# Patient Record
Sex: FEMALE | Race: WHITE | NOT HISPANIC OR LATINO | Employment: FULL TIME | URBAN - NONMETROPOLITAN AREA
[De-identification: names, ages, dates, MRNs, and addresses within clinical notes are randomized per-mention and may not be internally consistent; named-entity substitution may affect disease eponyms.]

---

## 2017-04-23 ENCOUNTER — HOSPITAL ENCOUNTER (OUTPATIENT)
Facility: HOSPITAL | Age: 37
Discharge: HOME | End: 2017-04-23
Attending: OBSTETRICS & GYNECOLOGY | Admitting: OBSTETRICS & GYNECOLOGY

## 2017-04-23 VITALS
TEMPERATURE: 98.3 F | HEART RATE: 102 BPM | RESPIRATION RATE: 18 BRPM | DIASTOLIC BLOOD PRESSURE: 59 MMHG | SYSTOLIC BLOOD PRESSURE: 103 MMHG

## 2017-04-23 LAB
BACTERIA UR QL AUTO: ABNORMAL /HPF
BASOPHILS # BLD AUTO: 0.04 10*3/MM3 (ref 0–0.3)
BASOPHILS NFR BLD AUTO: 0.2 % (ref 0–2)
BILIRUB UR QL STRIP: NEGATIVE
CLARITY UR: ABNORMAL
COLOR UR: ABNORMAL
DEPRECATED RDW RBC AUTO: 47.4 FL (ref 37–54)
EOSINOPHIL # BLD AUTO: 0.41 10*3/MM3 (ref 0–0.7)
EOSINOPHIL NFR BLD AUTO: 2.2 % (ref 0–5)
ERYTHROCYTE [DISTWIDTH] IN BLOOD BY AUTOMATED COUNT: 14.7 % (ref 11.5–14.5)
GLUCOSE UR STRIP-MCNC: NEGATIVE MG/DL
HCT VFR BLD AUTO: 34.9 % (ref 37–47)
HGB BLD-MCNC: 11.5 G/DL (ref 12–16)
HGB UR QL STRIP.AUTO: ABNORMAL
IMM GRANULOCYTES # BLD: 0.14 10*3/MM3 (ref 0–0.03)
IMM GRANULOCYTES NFR BLD: 0.8 % (ref 0–0.5)
KETONES UR QL STRIP: ABNORMAL
LEUKOCYTE ESTERASE UR QL STRIP.AUTO: ABNORMAL
LYMPHOCYTES # BLD AUTO: 1.78 10*3/MM3 (ref 1–3)
LYMPHOCYTES NFR BLD AUTO: 9.6 % (ref 21–51)
MCH RBC QN AUTO: 30.2 PG (ref 27–33)
MCHC RBC AUTO-ENTMCNC: 33 G/DL (ref 33–37)
MCV RBC AUTO: 91.6 FL (ref 80–94)
MONOCYTES # BLD AUTO: 1.21 10*3/MM3 (ref 0.1–0.9)
MONOCYTES NFR BLD AUTO: 6.5 % (ref 0–10)
NEUTROPHILS # BLD AUTO: 14.97 10*3/MM3 (ref 1.4–6.5)
NEUTROPHILS NFR BLD AUTO: 80.7 % (ref 30–70)
NITRITE UR QL STRIP: POSITIVE
PH UR STRIP.AUTO: 6.5 [PH] (ref 5–8)
PLATELET # BLD AUTO: 319 10*3/MM3 (ref 130–400)
PMV BLD AUTO: 9.5 FL (ref 6–10)
PROT UR QL STRIP: ABNORMAL
RBC # BLD AUTO: 3.81 10*6/MM3 (ref 4.2–5.4)
RBC # UR: ABNORMAL /HPF
REF LAB TEST METHOD: ABNORMAL
SP GR UR STRIP: 1.02 (ref 1–1.03)
SQUAMOUS #/AREA URNS HPF: ABNORMAL /HPF
UROBILINOGEN UR QL STRIP: ABNORMAL
WBC NRBC COR # BLD: 18.55 10*3/MM3 (ref 4.5–12.5)
WBC UR QL AUTO: ABNORMAL /HPF

## 2017-04-23 PROCEDURE — P9612 CATHETERIZE FOR URINE SPEC: HCPCS

## 2017-04-23 PROCEDURE — 81001 URINALYSIS AUTO W/SCOPE: CPT | Performed by: OBSTETRICS & GYNECOLOGY

## 2017-04-23 PROCEDURE — 96372 THER/PROPH/DIAG INJ SC/IM: CPT

## 2017-04-23 PROCEDURE — G0463 HOSPITAL OUTPT CLINIC VISIT: HCPCS

## 2017-04-23 PROCEDURE — 25010000002 CEFTRIAXONE PER 250 MG: Performed by: OBSTETRICS & GYNECOLOGY

## 2017-04-23 PROCEDURE — 87077 CULTURE AEROBIC IDENTIFY: CPT | Performed by: OBSTETRICS & GYNECOLOGY

## 2017-04-23 PROCEDURE — 87086 URINE CULTURE/COLONY COUNT: CPT | Performed by: OBSTETRICS & GYNECOLOGY

## 2017-04-23 PROCEDURE — 87186 SC STD MICRODIL/AGAR DIL: CPT | Performed by: OBSTETRICS & GYNECOLOGY

## 2017-04-23 PROCEDURE — 85025 COMPLETE CBC W/AUTO DIFF WBC: CPT | Performed by: OBSTETRICS & GYNECOLOGY

## 2017-04-23 RX ORDER — CEFTRIAXONE 1 G/1
1 INJECTION, POWDER, FOR SOLUTION INTRAMUSCULAR; INTRAVENOUS EVERY 24 HOURS
Status: DISCONTINUED | OUTPATIENT
Start: 2017-04-23 | End: 2017-04-23 | Stop reason: HOSPADM

## 2017-04-23 RX ADMIN — CEFTRIAXONE SODIUM 1 G: 1 INJECTION, POWDER, FOR SOLUTION INTRAMUSCULAR; INTRAVENOUS at 10:51

## 2017-04-25 LAB — BACTERIA SPEC AEROBE CULT: ABNORMAL

## 2017-04-28 ENCOUNTER — TELEPHONE (OUTPATIENT)
Dept: FAMILY MEDICINE CLINIC | Facility: CLINIC | Age: 37
End: 2017-04-28

## 2017-04-28 NOTE — TELEPHONE ENCOUNTER
----- Message from Arely Bellamy MD sent at 4/28/2017  6:59 AM EDT -----  Please call patient. She has never been seen in this office. She went to the ER last week for something - they ran an UCx, but they never gave her an antibiotic. She does have an UTI and needs to be seen. Please call and let her know - and have her make an appointment as a new patient. 20 minute appt is okay (write Per Josesito). Thanks.      Left a message for her to return call.    Patient returned call & reports she is on keflex right now that they called & placed her on,is being seen in [place] & declined an apt. Here.

## 2018-11-30 ENCOUNTER — APPOINTMENT (OUTPATIENT)
Dept: GENERAL RADIOLOGY | Facility: HOSPITAL | Age: 38
End: 2018-11-30

## 2018-11-30 ENCOUNTER — HOSPITAL ENCOUNTER (EMERGENCY)
Facility: HOSPITAL | Age: 38
Discharge: HOME | End: 2018-11-30
Attending: EMERGENCY MEDICINE | Admitting: EMERGENCY MEDICINE

## 2018-11-30 VITALS
BODY MASS INDEX: 20.83 KG/M2 | RESPIRATION RATE: 18 BRPM | WEIGHT: 125 LBS | SYSTOLIC BLOOD PRESSURE: 123 MMHG | TEMPERATURE: 100.8 F | HEIGHT: 65 IN | DIASTOLIC BLOOD PRESSURE: 76 MMHG | HEART RATE: 88 BPM | OXYGEN SATURATION: 98 %

## 2018-11-30 DIAGNOSIS — K08.89 PAIN, DENTAL: ICD-10-CM

## 2018-11-30 DIAGNOSIS — R11.2 NON-INTRACTABLE VOMITING WITH NAUSEA, UNSPECIFIED VOMITING TYPE: Primary | ICD-10-CM

## 2018-11-30 LAB
ALBUMIN SERPL-MCNC: 4.1 G/DL (ref 3.5–5)
ALBUMIN/GLOB SERPL: 1.5 G/DL (ref 1.5–2.5)
ALP SERPL-CCNC: 69 U/L (ref 35–104)
ALT SERPL W P-5'-P-CCNC: 14 U/L (ref 10–36)
AMYLASE SERPL-CCNC: 71 U/L (ref 28–100)
ANION GAP SERPL CALCULATED.3IONS-SCNC: 1.6 MMOL/L (ref 3.6–11.2)
AST SERPL-CCNC: 15 U/L (ref 10–30)
BACTERIA UR QL AUTO: ABNORMAL /HPF
BASOPHILS # BLD AUTO: 0.02 10*3/MM3 (ref 0–0.3)
BASOPHILS NFR BLD AUTO: 0.2 % (ref 0–2)
BILIRUB SERPL-MCNC: 0.6 MG/DL (ref 0.2–1.8)
BILIRUB UR QL STRIP: NEGATIVE
BUN BLD-MCNC: 12 MG/DL (ref 7–21)
BUN/CREAT SERPL: 15.8 (ref 7–25)
CALCIUM SPEC-SCNC: 9 MG/DL (ref 7.7–10)
CHLORIDE SERPL-SCNC: 108 MMOL/L (ref 99–112)
CLARITY UR: CLEAR
CO2 SERPL-SCNC: 28.4 MMOL/L (ref 24.3–31.9)
COLOR UR: YELLOW
CREAT BLD-MCNC: 0.76 MG/DL (ref 0.43–1.29)
CRP SERPL-MCNC: 1.54 MG/DL (ref 0–0.99)
DEPRECATED RDW RBC AUTO: 48.4 FL (ref 37–54)
EOSINOPHIL # BLD AUTO: 0.17 10*3/MM3 (ref 0–0.7)
EOSINOPHIL NFR BLD AUTO: 1.8 % (ref 0–5)
ERYTHROCYTE [DISTWIDTH] IN BLOOD BY AUTOMATED COUNT: 15.1 % (ref 11.5–14.5)
FLUAV AG NPH QL: NEGATIVE
FLUBV AG NPH QL IA: NEGATIVE
GFR SERPL CREATININE-BSD FRML MDRD: 85 ML/MIN/1.73
GLOBULIN UR ELPH-MCNC: 2.7 GM/DL
GLUCOSE BLD-MCNC: 98 MG/DL (ref 70–110)
GLUCOSE UR STRIP-MCNC: NEGATIVE MG/DL
HCT VFR BLD AUTO: 40.8 % (ref 37–47)
HGB BLD-MCNC: 13.1 G/DL (ref 12–16)
HGB UR QL STRIP.AUTO: NEGATIVE
HYALINE CASTS UR QL AUTO: ABNORMAL /LPF
IMM GRANULOCYTES # BLD: 0.01 10*3/MM3 (ref 0–0.03)
IMM GRANULOCYTES NFR BLD: 0.1 % (ref 0–0.5)
KETONES UR QL STRIP: NEGATIVE
LEUKOCYTE ESTERASE UR QL STRIP.AUTO: ABNORMAL
LIPASE SERPL-CCNC: 36 U/L (ref 13–60)
LYMPHOCYTES # BLD AUTO: 0.66 10*3/MM3 (ref 1–3)
LYMPHOCYTES NFR BLD AUTO: 7 % (ref 21–51)
MAGNESIUM SERPL-MCNC: 2 MG/DL (ref 1.7–2.6)
MCH RBC QN AUTO: 28.2 PG (ref 27–33)
MCHC RBC AUTO-ENTMCNC: 32.1 G/DL (ref 33–37)
MCV RBC AUTO: 87.9 FL (ref 80–94)
MONOCYTES # BLD AUTO: 0.31 10*3/MM3 (ref 0.1–0.9)
MONOCYTES NFR BLD AUTO: 3.3 % (ref 0–10)
NEUTROPHILS # BLD AUTO: 8.32 10*3/MM3 (ref 1.4–6.5)
NEUTROPHILS NFR BLD AUTO: 87.6 % (ref 30–70)
NITRITE UR QL STRIP: NEGATIVE
OSMOLALITY SERPL CALC.SUM OF ELEC: 275.4 MOSM/KG (ref 273–305)
PH UR STRIP.AUTO: >=9 [PH] (ref 5–8)
PLATELET # BLD AUTO: 315 10*3/MM3 (ref 130–400)
PMV BLD AUTO: 10.1 FL (ref 6–10)
POTASSIUM BLD-SCNC: 3.9 MMOL/L (ref 3.5–5.3)
PROT SERPL-MCNC: 6.8 G/DL (ref 6–8)
PROT UR QL STRIP: NEGATIVE
RBC # BLD AUTO: 4.64 10*6/MM3 (ref 4.2–5.4)
RBC # UR: ABNORMAL /HPF
REF LAB TEST METHOD: ABNORMAL
SODIUM BLD-SCNC: 138 MMOL/L (ref 135–153)
SP GR UR STRIP: 1.02 (ref 1–1.03)
SQUAMOUS #/AREA URNS HPF: ABNORMAL /HPF
UROBILINOGEN UR QL STRIP: ABNORMAL
WBC NRBC COR # BLD: 9.49 10*3/MM3 (ref 4.5–12.5)
WBC UR QL AUTO: ABNORMAL /HPF

## 2018-11-30 PROCEDURE — 85025 COMPLETE CBC W/AUTO DIFF WBC: CPT | Performed by: PHYSICIAN ASSISTANT

## 2018-11-30 PROCEDURE — 87804 INFLUENZA ASSAY W/OPTIC: CPT | Performed by: PHYSICIAN ASSISTANT

## 2018-11-30 PROCEDURE — 71045 X-RAY EXAM CHEST 1 VIEW: CPT | Performed by: RADIOLOGY

## 2018-11-30 PROCEDURE — 71045 X-RAY EXAM CHEST 1 VIEW: CPT

## 2018-11-30 PROCEDURE — 83735 ASSAY OF MAGNESIUM: CPT | Performed by: PHYSICIAN ASSISTANT

## 2018-11-30 PROCEDURE — 25010000002 CEFTRIAXONE: Performed by: PHYSICIAN ASSISTANT

## 2018-11-30 PROCEDURE — 25010000002 ONDANSETRON PER 1 MG: Performed by: PHYSICIAN ASSISTANT

## 2018-11-30 PROCEDURE — 80053 COMPREHEN METABOLIC PANEL: CPT | Performed by: PHYSICIAN ASSISTANT

## 2018-11-30 PROCEDURE — 99284 EMERGENCY DEPT VISIT MOD MDM: CPT

## 2018-11-30 PROCEDURE — 82150 ASSAY OF AMYLASE: CPT | Performed by: PHYSICIAN ASSISTANT

## 2018-11-30 PROCEDURE — 96365 THER/PROPH/DIAG IV INF INIT: CPT

## 2018-11-30 PROCEDURE — 96375 TX/PRO/DX INJ NEW DRUG ADDON: CPT

## 2018-11-30 PROCEDURE — 81001 URINALYSIS AUTO W/SCOPE: CPT | Performed by: PHYSICIAN ASSISTANT

## 2018-11-30 PROCEDURE — 87040 BLOOD CULTURE FOR BACTERIA: CPT | Performed by: PHYSICIAN ASSISTANT

## 2018-11-30 PROCEDURE — 83690 ASSAY OF LIPASE: CPT | Performed by: PHYSICIAN ASSISTANT

## 2018-11-30 PROCEDURE — 86140 C-REACTIVE PROTEIN: CPT | Performed by: PHYSICIAN ASSISTANT

## 2018-11-30 RX ORDER — SODIUM CHLORIDE 0.9 % (FLUSH) 0.9 %
10 SYRINGE (ML) INJECTION AS NEEDED
Status: DISCONTINUED | OUTPATIENT
Start: 2018-11-30 | End: 2018-11-30 | Stop reason: HOSPADM

## 2018-11-30 RX ORDER — ONDANSETRON 2 MG/ML
4 INJECTION INTRAMUSCULAR; INTRAVENOUS ONCE
Status: COMPLETED | OUTPATIENT
Start: 2018-11-30 | End: 2018-11-30

## 2018-11-30 RX ORDER — FAMOTIDINE 20 MG/1
20 TABLET, FILM COATED ORAL 2 TIMES DAILY
Qty: 28 TABLET | Refills: 0 | Status: SHIPPED | OUTPATIENT
Start: 2018-11-30 | End: 2019-03-19

## 2018-11-30 RX ORDER — FAMOTIDINE 10 MG/ML
20 INJECTION, SOLUTION INTRAVENOUS ONCE
Status: COMPLETED | OUTPATIENT
Start: 2018-11-30 | End: 2018-11-30

## 2018-11-30 RX ORDER — ONDANSETRON 4 MG/1
4 TABLET, ORALLY DISINTEGRATING ORAL EVERY 6 HOURS PRN
Qty: 10 TABLET | Refills: 0 | Status: SHIPPED | OUTPATIENT
Start: 2018-11-30 | End: 2019-03-19 | Stop reason: SDUPTHER

## 2018-11-30 RX ORDER — ACETAMINOPHEN 500 MG
1000 TABLET ORAL ONCE
Status: COMPLETED | OUTPATIENT
Start: 2018-11-30 | End: 2018-11-30

## 2018-11-30 RX ORDER — AMOXICILLIN AND CLAVULANATE POTASSIUM 875; 125 MG/1; MG/1
1 TABLET, FILM COATED ORAL 2 TIMES DAILY
Qty: 20 TABLET | Refills: 0 | Status: SHIPPED | OUTPATIENT
Start: 2018-11-30 | End: 2018-12-10

## 2018-11-30 RX ADMIN — ONDANSETRON 4 MG: 2 INJECTION, SOLUTION INTRAMUSCULAR; INTRAVENOUS at 11:39

## 2018-11-30 RX ADMIN — CEFTRIAXONE 1 G: 1 INJECTION, POWDER, FOR SOLUTION INTRAMUSCULAR; INTRAVENOUS at 11:38

## 2018-11-30 RX ADMIN — SODIUM CHLORIDE, PRESERVATIVE FREE 10 ML: 5 INJECTION INTRAVENOUS at 11:39

## 2018-11-30 RX ADMIN — BENZOCAINE: 200 LIQUID DENTAL; ORAL; PERIODONTAL at 13:47

## 2018-11-30 RX ADMIN — SODIUM CHLORIDE 1000 ML: 9 INJECTION, SOLUTION INTRAVENOUS at 11:38

## 2018-11-30 RX ADMIN — FAMOTIDINE 20 MG: 10 INJECTION, SOLUTION INTRAVENOUS at 11:39

## 2018-11-30 RX ADMIN — ACETAMINOPHEN 1000 MG: 500 TABLET ORAL at 13:46

## 2018-12-05 LAB
BACTERIA SPEC AEROBE CULT: NORMAL
BACTERIA SPEC AEROBE CULT: NORMAL

## 2019-03-19 ENCOUNTER — APPOINTMENT (OUTPATIENT)
Dept: GENERAL RADIOLOGY | Facility: HOSPITAL | Age: 39
End: 2019-03-19

## 2019-03-19 ENCOUNTER — APPOINTMENT (OUTPATIENT)
Dept: CT IMAGING | Facility: HOSPITAL | Age: 39
End: 2019-03-19

## 2019-03-19 ENCOUNTER — HOSPITAL ENCOUNTER (EMERGENCY)
Facility: HOSPITAL | Age: 39
Discharge: HOME | End: 2019-03-19
Attending: EMERGENCY MEDICINE | Admitting: EMERGENCY MEDICINE

## 2019-03-19 VITALS
TEMPERATURE: 98.1 F | SYSTOLIC BLOOD PRESSURE: 109 MMHG | OXYGEN SATURATION: 99 % | HEART RATE: 83 BPM | WEIGHT: 120 LBS | BODY MASS INDEX: 19.29 KG/M2 | HEIGHT: 66 IN | DIASTOLIC BLOOD PRESSURE: 77 MMHG | RESPIRATION RATE: 18 BRPM

## 2019-03-19 DIAGNOSIS — R10.31 RIGHT LOWER QUADRANT ABDOMINAL PAIN: Primary | ICD-10-CM

## 2019-03-19 LAB
ALBUMIN SERPL-MCNC: 3.91 G/DL (ref 3.5–5.2)
ALBUMIN/GLOB SERPL: 1.5 G/DL
ALP SERPL-CCNC: 70 U/L (ref 39–117)
ALT SERPL W P-5'-P-CCNC: 8 U/L (ref 1–33)
AMYLASE SERPL-CCNC: 70 U/L (ref 28–100)
ANION GAP SERPL CALCULATED.3IONS-SCNC: 8.8 MMOL/L
AST SERPL-CCNC: 14 U/L (ref 1–32)
BASOPHILS # BLD AUTO: 0.01 10*3/MM3 (ref 0–0.2)
BASOPHILS # BLD AUTO: 0.02 10*3/MM3 (ref 0–0.2)
BASOPHILS NFR BLD AUTO: 0.1 % (ref 0–1.5)
BASOPHILS NFR BLD AUTO: 0.2 % (ref 0–1.5)
BILIRUB SERPL-MCNC: 0.4 MG/DL (ref 0.2–1.2)
BILIRUB UR QL STRIP: NEGATIVE
BUN BLD-MCNC: 12 MG/DL (ref 6–20)
BUN/CREAT SERPL: 17.6 (ref 7–25)
CALCIUM SPEC-SCNC: 8.6 MG/DL (ref 8.6–10.5)
CHLORIDE SERPL-SCNC: 104 MMOL/L (ref 98–107)
CLARITY UR: CLEAR
CO2 SERPL-SCNC: 22.2 MMOL/L (ref 22–29)
COLOR UR: YELLOW
CREAT BLD-MCNC: 0.68 MG/DL (ref 0.57–1)
CRP SERPL-MCNC: 1.5 MG/DL (ref 0–0.5)
CRP SERPL-MCNC: 1.58 MG/DL (ref 0–0.5)
D-LACTATE SERPL-SCNC: 0.7 MMOL/L (ref 0.5–2)
DEPRECATED RDW RBC AUTO: 47.9 FL (ref 37–54)
DEPRECATED RDW RBC AUTO: 48.3 FL (ref 37–54)
EOSINOPHIL # BLD AUTO: 0.25 10*3/MM3 (ref 0–0.4)
EOSINOPHIL # BLD AUTO: 0.34 10*3/MM3 (ref 0–0.4)
EOSINOPHIL NFR BLD AUTO: 3.1 % (ref 0.3–6.2)
EOSINOPHIL NFR BLD AUTO: 3.6 % (ref 0.3–6.2)
ERYTHROCYTE [DISTWIDTH] IN BLOOD BY AUTOMATED COUNT: 14.7 % (ref 12.3–15.4)
ERYTHROCYTE [DISTWIDTH] IN BLOOD BY AUTOMATED COUNT: 14.9 % (ref 12.3–15.4)
GFR SERPL CREATININE-BSD FRML MDRD: 96 ML/MIN/1.73
GLOBULIN UR ELPH-MCNC: 2.7 GM/DL
GLUCOSE BLD-MCNC: 99 MG/DL (ref 65–99)
GLUCOSE UR STRIP-MCNC: NEGATIVE MG/DL
HCG SERPL QL: NEGATIVE
HCT VFR BLD AUTO: 35.4 % (ref 34–46.6)
HCT VFR BLD AUTO: 40.7 % (ref 34–46.6)
HGB BLD-MCNC: 11.1 G/DL (ref 12–15.9)
HGB BLD-MCNC: 12.8 G/DL (ref 12–15.9)
HGB UR QL STRIP.AUTO: NEGATIVE
HOLD SPECIMEN: NORMAL
HOLD SPECIMEN: NORMAL
IMM GRANULOCYTES # BLD AUTO: 0.02 10*3/MM3 (ref 0–0.05)
IMM GRANULOCYTES # BLD AUTO: 0.02 10*3/MM3 (ref 0–0.05)
IMM GRANULOCYTES NFR BLD AUTO: 0.2 % (ref 0–0.5)
IMM GRANULOCYTES NFR BLD AUTO: 0.2 % (ref 0–0.5)
KETONES UR QL STRIP: NEGATIVE
LEUKOCYTE ESTERASE UR QL STRIP.AUTO: NEGATIVE
LIPASE SERPL-CCNC: 39 U/L (ref 13–60)
LYMPHOCYTES # BLD AUTO: 0.65 10*3/MM3 (ref 0.7–3.1)
LYMPHOCYTES # BLD AUTO: 0.82 10*3/MM3 (ref 0.7–3.1)
LYMPHOCYTES NFR BLD AUTO: 10.2 % (ref 19.6–45.3)
LYMPHOCYTES NFR BLD AUTO: 6.9 % (ref 19.6–45.3)
MCH RBC QN AUTO: 27.9 PG (ref 26.6–33)
MCH RBC QN AUTO: 28.2 PG (ref 26.6–33)
MCHC RBC AUTO-ENTMCNC: 31.4 G/DL (ref 31.5–35.7)
MCHC RBC AUTO-ENTMCNC: 31.4 G/DL (ref 31.5–35.7)
MCV RBC AUTO: 88.9 FL (ref 79–97)
MCV RBC AUTO: 89.8 FL (ref 79–97)
MONOCYTES # BLD AUTO: 0.22 10*3/MM3 (ref 0.1–0.9)
MONOCYTES # BLD AUTO: 0.29 10*3/MM3 (ref 0.1–0.9)
MONOCYTES NFR BLD AUTO: 2.7 % (ref 5–12)
MONOCYTES NFR BLD AUTO: 3.1 % (ref 5–12)
NEUTROPHILS # BLD AUTO: 6.75 10*3/MM3 (ref 1.4–7)
NEUTROPHILS # BLD AUTO: 8.07 10*3/MM3 (ref 1.4–7)
NEUTROPHILS NFR BLD AUTO: 83.7 % (ref 42.7–76)
NEUTROPHILS NFR BLD AUTO: 86 % (ref 42.7–76)
NITRITE UR QL STRIP: NEGATIVE
PH UR STRIP.AUTO: 5.5 [PH] (ref 5–8)
PLATELET # BLD AUTO: 237 10*3/MM3 (ref 140–450)
PLATELET # BLD AUTO: 272 10*3/MM3 (ref 140–450)
PMV BLD AUTO: 9.5 FL (ref 6–12)
PMV BLD AUTO: 9.6 FL (ref 6–12)
POTASSIUM BLD-SCNC: 4 MMOL/L (ref 3.5–5.2)
PROT SERPL-MCNC: 6.6 G/DL (ref 6–8.5)
PROT UR QL STRIP: NEGATIVE
RBC # BLD AUTO: 3.94 10*6/MM3 (ref 3.77–5.28)
RBC # BLD AUTO: 4.58 10*6/MM3 (ref 3.77–5.28)
SODIUM BLD-SCNC: 135 MMOL/L (ref 136–145)
SP GR UR STRIP: 1.02 (ref 1–1.03)
TROPONIN T SERPL-MCNC: <0.01 NG/ML (ref 0–0.03)
UROBILINOGEN UR QL STRIP: NORMAL
WBC NRBC COR # BLD: 8.07 10*3/MM3 (ref 3.4–10.8)
WBC NRBC COR # BLD: 9.39 10*3/MM3 (ref 3.4–10.8)
WHOLE BLOOD HOLD SPECIMEN: NORMAL
WHOLE BLOOD HOLD SPECIMEN: NORMAL

## 2019-03-19 PROCEDURE — 25010000002 MORPHINE PER 10 MG: Performed by: EMERGENCY MEDICINE

## 2019-03-19 PROCEDURE — 99285 EMERGENCY DEPT VISIT HI MDM: CPT

## 2019-03-19 PROCEDURE — 25010000002 IOPAMIDOL 61 % SOLUTION: Performed by: EMERGENCY MEDICINE

## 2019-03-19 PROCEDURE — 74177 CT ABD & PELVIS W/CONTRAST: CPT

## 2019-03-19 PROCEDURE — 82150 ASSAY OF AMYLASE: CPT | Performed by: EMERGENCY MEDICINE

## 2019-03-19 PROCEDURE — 84484 ASSAY OF TROPONIN QUANT: CPT | Performed by: EMERGENCY MEDICINE

## 2019-03-19 PROCEDURE — 25010000002 ONDANSETRON PER 1 MG: Performed by: EMERGENCY MEDICINE

## 2019-03-19 PROCEDURE — 83690 ASSAY OF LIPASE: CPT | Performed by: EMERGENCY MEDICINE

## 2019-03-19 PROCEDURE — 83605 ASSAY OF LACTIC ACID: CPT | Performed by: EMERGENCY MEDICINE

## 2019-03-19 PROCEDURE — 74177 CT ABD & PELVIS W/CONTRAST: CPT | Performed by: RADIOLOGY

## 2019-03-19 PROCEDURE — 25010000002 KETOROLAC TROMETHAMINE PER 15 MG: Performed by: EMERGENCY MEDICINE

## 2019-03-19 PROCEDURE — 93005 ELECTROCARDIOGRAM TRACING: CPT | Performed by: EMERGENCY MEDICINE

## 2019-03-19 PROCEDURE — 80053 COMPREHEN METABOLIC PANEL: CPT | Performed by: EMERGENCY MEDICINE

## 2019-03-19 PROCEDURE — 84703 CHORIONIC GONADOTROPIN ASSAY: CPT | Performed by: EMERGENCY MEDICINE

## 2019-03-19 PROCEDURE — 71045 X-RAY EXAM CHEST 1 VIEW: CPT

## 2019-03-19 PROCEDURE — 96374 THER/PROPH/DIAG INJ IV PUSH: CPT

## 2019-03-19 PROCEDURE — 81003 URINALYSIS AUTO W/O SCOPE: CPT | Performed by: EMERGENCY MEDICINE

## 2019-03-19 PROCEDURE — 86140 C-REACTIVE PROTEIN: CPT | Performed by: EMERGENCY MEDICINE

## 2019-03-19 PROCEDURE — 93010 ELECTROCARDIOGRAM REPORT: CPT | Performed by: INTERNAL MEDICINE

## 2019-03-19 PROCEDURE — 85025 COMPLETE CBC W/AUTO DIFF WBC: CPT | Performed by: EMERGENCY MEDICINE

## 2019-03-19 PROCEDURE — 71045 X-RAY EXAM CHEST 1 VIEW: CPT | Performed by: RADIOLOGY

## 2019-03-19 PROCEDURE — 96361 HYDRATE IV INFUSION ADD-ON: CPT

## 2019-03-19 PROCEDURE — 96375 TX/PRO/DX INJ NEW DRUG ADDON: CPT

## 2019-03-19 RX ORDER — KETOROLAC TROMETHAMINE 30 MG/ML
15 INJECTION, SOLUTION INTRAMUSCULAR; INTRAVENOUS ONCE
Status: COMPLETED | OUTPATIENT
Start: 2019-03-19 | End: 2019-03-19

## 2019-03-19 RX ORDER — ONDANSETRON 2 MG/ML
4 INJECTION INTRAMUSCULAR; INTRAVENOUS ONCE
Status: COMPLETED | OUTPATIENT
Start: 2019-03-19 | End: 2019-03-19

## 2019-03-19 RX ORDER — BISACODYL 5 MG/1
15 TABLET, DELAYED RELEASE ORAL ONCE
Status: COMPLETED | OUTPATIENT
Start: 2019-03-19 | End: 2019-03-19

## 2019-03-19 RX ORDER — SODIUM CHLORIDE 9 MG/ML
125 INJECTION, SOLUTION INTRAVENOUS CONTINUOUS
Status: DISCONTINUED | OUTPATIENT
Start: 2019-03-19 | End: 2019-03-19 | Stop reason: HOSPADM

## 2019-03-19 RX ORDER — ONDANSETRON 4 MG/1
4 TABLET, ORALLY DISINTEGRATING ORAL EVERY 6 HOURS PRN
Qty: 10 TABLET | Refills: 0 | Status: SHIPPED | OUTPATIENT
Start: 2019-03-19

## 2019-03-19 RX ORDER — POLYETHYLENE GLYCOL 3350 17 G/17G
17 POWDER, FOR SOLUTION ORAL DAILY
Qty: 10 EACH | Refills: 0 | Status: SHIPPED | OUTPATIENT
Start: 2019-03-19

## 2019-03-19 RX ADMIN — BISACODYL 15 MG: 5 TABLET, DELAYED RELEASE ORAL at 14:41

## 2019-03-19 RX ADMIN — ONDANSETRON 4 MG: 2 INJECTION, SOLUTION INTRAMUSCULAR; INTRAVENOUS at 10:04

## 2019-03-19 RX ADMIN — IOPAMIDOL 100 ML: 612 INJECTION, SOLUTION INTRAVENOUS at 11:04

## 2019-03-19 RX ADMIN — SODIUM CHLORIDE 125 ML/HR: 9 INJECTION, SOLUTION INTRAVENOUS at 10:05

## 2019-03-19 RX ADMIN — KETOROLAC TROMETHAMINE 15 MG: 30 INJECTION, SOLUTION INTRAMUSCULAR; INTRAVENOUS at 12:32

## 2019-03-19 RX ADMIN — SODIUM CHLORIDE 1000 ML: 9 INJECTION, SOLUTION INTRAVENOUS at 10:04

## 2019-03-19 RX ADMIN — SODIUM CHLORIDE 1000 ML: 9 INJECTION, SOLUTION INTRAVENOUS at 12:32

## 2019-03-19 RX ADMIN — MORPHINE SULFATE 4 MG: 4 INJECTION INTRAVENOUS at 10:04

## 2019-03-19 NOTE — DISCHARGE INSTRUCTIONS
Home to rest.  Drink plenty of fluids.  Medications as prescribed.  See your primary care provider, Byron Douglas, in the office tomorrow for recheck.  Return to the emergency department right away if symptoms worsen/any problems.

## 2019-03-19 NOTE — ED PROVIDER NOTES
"Subjective   Patient is a 38-year-old female who presents today with a chief complaint of abdominal pain.  She reports that last night she \"got a stomach bug\", had nausea, vomiting, diarrhea, several episodes of each.  That seemed to resolve, but this morning she has developed right lower quadrant and suprapubic abdominal pain described as cramping.  She denies fever, chills, chest pain, shortness of breath, hematemesis, hematochezia or melena, syncope or near syncope, focal numbness or weakness, other symptoms or other complaints.            Review of Systems   Constitutional: Negative for chills, diaphoresis and fever.   HENT: Negative for ear pain, sore throat and trouble swallowing.    Eyes: Negative for photophobia and pain.   Respiratory: Negative for shortness of breath, wheezing and stridor.    Cardiovascular: Negative for chest pain and palpitations.   Gastrointestinal: Positive for abdominal pain, diarrhea, nausea and vomiting. Negative for abdominal distention and blood in stool.   Endocrine: Negative for polydipsia and polyphagia.   Genitourinary: Negative for difficulty urinating and flank pain.   Musculoskeletal: Negative for back pain, neck pain and neck stiffness.   Skin: Negative for color change and pallor.   Neurological: Negative for seizures, syncope and speech difficulty.   Psychiatric/Behavioral: Negative for confusion.   All other systems reviewed and are negative.      History reviewed. No pertinent past medical history.    No Known Allergies    Past Surgical History:   Procedure Laterality Date   • EXPLORATORY LAPAROTOMY         History reviewed. No pertinent family history.    Social History     Socioeconomic History   • Marital status: Legally      Spouse name: Not on file   • Number of children: Not on file   • Years of education: Not on file   • Highest education level: Not on file   Tobacco Use   • Smoking status: Current Every Day Smoker     Packs/day: 0.50   • Smokeless " tobacco: Never Used   Substance and Sexual Activity   • Alcohol use: No   • Drug use: No   • Sexual activity: Defer           Objective   Physical Exam   Constitutional: She is oriented to person, place, and time. She appears well-developed and well-nourished.  Non-toxic appearance. No distress.   HENT:   Head: Normocephalic and atraumatic.   Eyes: EOM are normal. Pupils are equal, round, and reactive to light. No scleral icterus.   Neck: Normal range of motion. Neck supple. No neck rigidity. No tracheal deviation present.   Cardiovascular: Normal rate, regular rhythm and intact distal pulses.   Pulmonary/Chest: Effort normal and breath sounds normal. No respiratory distress. She exhibits no tenderness.   Abdominal: Soft. Bowel sounds are normal. There is tenderness (Moderate tenderness in the right mid abdomen, right lower quadrant,.  No other tenderness.  No acute peritoneal signs.). There is no rigidity, no rebound, no guarding and no CVA tenderness.   Musculoskeletal: Normal range of motion. She exhibits no tenderness.   Neurological: She is alert and oriented to person, place, and time. She has normal strength. No sensory deficit. She exhibits normal muscle tone. Coordination normal. GCS eye subscore is 4. GCS verbal subscore is 5. GCS motor subscore is 6.   Skin: Skin is warm and dry. Capillary refill takes less than 2 seconds. No cyanosis. No pallor.   Psychiatric: She has a normal mood and affect. Her behavior is normal.   Nursing note and vitals reviewed.      Procedures     CT Abdomen Pelvis With Contrast   Final Result   : Large volume stool in the colon   Small left adnexal cyst       No evidence of appendicitis                       This report was finalized on 3/19/2019 11:14 AM by Dr. Jey Mcnulty MD.          XR Chest 1 View   Final Result   No evidence of active or acute cardiopulmonary disease on today's chest   radiograph.       This report was finalized on 3/19/2019 10:46 AM by Dr. Jey Mcnulty  MD.            Results for orders placed or performed during the hospital encounter of 03/19/19   Comprehensive Metabolic Panel   Result Value Ref Range    Glucose 99 65 - 99 mg/dL    BUN 12 6 - 20 mg/dL    Creatinine 0.68 0.57 - 1.00 mg/dL    Sodium 135 (L) 136 - 145 mmol/L    Potassium 4.0 3.5 - 5.2 mmol/L    Chloride 104 98 - 107 mmol/L    CO2 22.2 22.0 - 29.0 mmol/L    Calcium 8.6 8.6 - 10.5 mg/dL    Total Protein 6.6 6.0 - 8.5 g/dL    Albumin 3.91 3.50 - 5.20 g/dL    ALT (SGPT) 8 1 - 33 U/L    AST (SGOT) 14 1 - 32 U/L    Alkaline Phosphatase 70 39 - 117 U/L    Total Bilirubin 0.4 0.2 - 1.2 mg/dL    eGFR Non African Amer 96 >60 mL/min/1.73    Globulin 2.7 gm/dL    A/G Ratio 1.5 g/dL    BUN/Creatinine Ratio 17.6 7.0 - 25.0    Anion Gap 8.8 mmol/L   Lipase   Result Value Ref Range    Lipase 39 13 - 60 U/L   Amylase   Result Value Ref Range    Amylase 70 28 - 100 U/L   Urinalysis With Microscopic If Indicated (No Culture) - Urine, Clean Catch   Result Value Ref Range    Color, UA Yellow Yellow, Straw    Appearance, UA Clear Clear    pH, UA 5.5 5.0 - 8.0    Specific Gravity, UA 1.016 1.005 - 1.030    Glucose, UA Negative Negative    Ketones, UA Negative Negative    Bilirubin, UA Negative Negative    Blood, UA Negative Negative    Protein, UA Negative Negative    Leuk Esterase, UA Negative Negative    Nitrite, UA Negative Negative    Urobilinogen, UA 0.2 E.U./dL 0.2 - 1.0 E.U./dL   Troponin   Result Value Ref Range    Troponin T <0.010 0.000 - 0.030 ng/mL   hCG, Serum, Qualitative   Result Value Ref Range    HCG Qualitative Negative Negative   Lactic Acid, Plasma   Result Value Ref Range    Lactate 0.7 0.5 - 2.0 mmol/L   CBC Auto Differential   Result Value Ref Range    WBC 9.39 3.40 - 10.80 10*3/mm3    RBC 4.58 3.77 - 5.28 10*6/mm3    Hemoglobin 12.8 12.0 - 15.9 g/dL    Hematocrit 40.7 34.0 - 46.6 %    MCV 88.9 79.0 - 97.0 fL    MCH 27.9 26.6 - 33.0 pg    MCHC 31.4 (L) 31.5 - 35.7 g/dL    RDW 14.9 12.3 - 15.4 %     RDW-SD 48.3 37.0 - 54.0 fl    MPV 9.6 6.0 - 12.0 fL    Platelets 272 140 - 450 10*3/mm3    Neutrophil % 86.0 (H) 42.7 - 76.0 %    Lymphocyte % 6.9 (L) 19.6 - 45.3 %    Monocyte % 3.1 (L) 5.0 - 12.0 %    Eosinophil % 3.6 0.3 - 6.2 %    Basophil % 0.2 0.0 - 1.5 %    Immature Grans % 0.2 0.0 - 0.5 %    Neutrophils, Absolute 8.07 (H) 1.40 - 7.00 10*3/mm3    Lymphocytes, Absolute 0.65 (L) 0.70 - 3.10 10*3/mm3    Monocytes, Absolute 0.29 0.10 - 0.90 10*3/mm3    Eosinophils, Absolute 0.34 0.00 - 0.40 10*3/mm3    Basophils, Absolute 0.02 0.00 - 0.20 10*3/mm3    Immature Grans, Absolute 0.02 0.00 - 0.05 10*3/mm3   C-reactive Protein   Result Value Ref Range    C-Reactive Protein 1.58 (H) 0.00 - 0.50 mg/dL   C-reactive Protein   Result Value Ref Range    C-Reactive Protein 1.50 (H) 0.00 - 0.50 mg/dL   CBC Auto Differential   Result Value Ref Range    WBC 8.07 3.40 - 10.80 10*3/mm3    RBC 3.94 3.77 - 5.28 10*6/mm3    Hemoglobin 11.1 (L) 12.0 - 15.9 g/dL    Hematocrit 35.4 34.0 - 46.6 %    MCV 89.8 79.0 - 97.0 fL    MCH 28.2 26.6 - 33.0 pg    MCHC 31.4 (L) 31.5 - 35.7 g/dL    RDW 14.7 12.3 - 15.4 %    RDW-SD 47.9 37.0 - 54.0 fl    MPV 9.5 6.0 - 12.0 fL    Platelets 237 140 - 450 10*3/mm3    Neutrophil % 83.7 (H) 42.7 - 76.0 %    Lymphocyte % 10.2 (L) 19.6 - 45.3 %    Monocyte % 2.7 (L) 5.0 - 12.0 %    Eosinophil % 3.1 0.3 - 6.2 %    Basophil % 0.1 0.0 - 1.5 %    Immature Grans % 0.2 0.0 - 0.5 %    Neutrophils, Absolute 6.75 1.40 - 7.00 10*3/mm3    Lymphocytes, Absolute 0.82 0.70 - 3.10 10*3/mm3    Monocytes, Absolute 0.22 0.10 - 0.90 10*3/mm3    Eosinophils, Absolute 0.25 0.00 - 0.40 10*3/mm3    Basophils, Absolute 0.01 0.00 - 0.20 10*3/mm3    Immature Grans, Absolute 0.02 0.00 - 0.05 10*3/mm3   Light Blue Top   Result Value Ref Range    Extra Tube hold for add-on    Green Top (Gel)   Result Value Ref Range    Extra Tube Hold for add-ons.    Lavender Top   Result Value Ref Range    Extra Tube hold for add-on    Gold Top - SST    Result Value Ref Range    Extra Tube Hold for add-ons.                 ED Course  ED Course as of Mar 19 1550   Tue Mar 19, 2019   1218 Patient and I discussed her test results thus far.  She voices that she is feeling much better.  She remains tender in her right lower quadrant, but less so.  At this moment we will continue hydration, will order her some more analgesics, recheck CBC and also CRP.  [CM]   1328 Patient declines her soapsuds enema, states that she will do this at home.  Awaiting repeat blood work results.  [CM]   1436 Patient's emergency department stay has been uneventful.  She did take her soapsuds enema, which she advises me produced moderate results.  She voices she feels much better.  Her abdomen is now soft and completely nontender to palpation throughout.  We discussed all of her test results and her plan of care.  She voices understanding and agreement.  She will follow-up closely with her primary care provider.  She will return to the emergency department immediately if her symptoms worsen or if she has any problems.  [CM]      ED Course User Index  [CM] Yeyo Skelton MD                  [facility]      Final diagnoses:   Right lower quadrant abdominal pain             Please note that portions of this note were completed with a voice recognition program. Efforts were made to edit the dictations, but occasionally words are mistranscribed.       Yeyo Skelton MD  03/19/19 2221

## 2020-09-18 ENCOUNTER — HOSPITAL ENCOUNTER (EMERGENCY)
Facility: HOSPITAL | Age: 40
Discharge: HOME | End: 2020-09-18
Admitting: EMERGENCY MEDICINE

## 2020-09-18 VITALS
HEIGHT: 66 IN | BODY MASS INDEX: 23.3 KG/M2 | SYSTOLIC BLOOD PRESSURE: 127 MMHG | DIASTOLIC BLOOD PRESSURE: 79 MMHG | RESPIRATION RATE: 20 BRPM | TEMPERATURE: 97.5 F | OXYGEN SATURATION: 99 % | HEART RATE: 88 BPM | WEIGHT: 145 LBS

## 2020-09-18 DIAGNOSIS — K02.9 PAIN DUE TO DENTAL CARIES: Primary | ICD-10-CM

## 2020-09-18 PROCEDURE — 99283 EMERGENCY DEPT VISIT LOW MDM: CPT

## 2020-09-18 RX ORDER — CLINDAMYCIN HYDROCHLORIDE 300 MG/1
300 CAPSULE ORAL 4 TIMES DAILY
Qty: 40 CAPSULE | Refills: 0 | Status: SHIPPED | OUTPATIENT
Start: 2020-09-18 | End: 2020-09-28

## 2020-09-18 RX ORDER — HYDROCODONE BITARTRATE AND ACETAMINOPHEN 5; 325 MG/1; MG/1
1 TABLET ORAL EVERY 6 HOURS PRN
Qty: 12 TABLET | Refills: 0 | Status: SHIPPED | OUTPATIENT
Start: 2020-09-18

## 2020-09-18 RX ORDER — CLINDAMYCIN HYDROCHLORIDE 150 MG/1
600 CAPSULE ORAL ONCE
Status: COMPLETED | OUTPATIENT
Start: 2020-09-18 | End: 2020-09-18

## 2020-09-18 RX ORDER — IBUPROFEN 800 MG/1
800 TABLET ORAL EVERY 6 HOURS PRN
Qty: 30 TABLET | Refills: 0 | Status: SHIPPED | OUTPATIENT
Start: 2020-09-18

## 2020-09-18 RX ADMIN — CLINDAMYCIN HYDROCHLORIDE 600 MG: 150 CAPSULE ORAL at 14:51

## 2020-09-18 RX ADMIN — BENZOCAINE: 200 LIQUID DENTAL; ORAL; PERIODONTAL at 14:51

## 2020-09-18 NOTE — ED PROVIDER NOTES
Subjective     History provided by:  Patient   used: No    Dental Pain  Location:  Upper  Quality:  Throbbing  Duration: several months, patient states she just got out of detention and hasn't seen a dentist yet.   Timing:  Constant  Progression:  Worsening  Chronicity:  New  Context: dental caries    Relieved by:  Nothing  Worsened by:  Nothing  Ineffective treatments:  None tried  Risk factors: lack of dental care and smoking        Review of Systems   Constitutional: Negative.    HENT: Negative.    Eyes: Negative.    Respiratory: Negative.    Cardiovascular: Negative.    Gastrointestinal: Negative.    Endocrine: Negative.    Genitourinary: Negative.    Musculoskeletal: Negative.    Skin: Negative.    Allergic/Immunologic: Negative.    Neurological: Negative.    Hematological: Negative.    Psychiatric/Behavioral: Negative.    All other systems reviewed and are negative.      No past medical history on file.    No Known Allergies    Past Surgical History:   Procedure Laterality Date   • EXPLORATORY LAPAROTOMY         No family history on file.    Social History     Socioeconomic History   • Marital status: Legally      Spouse name: Not on file   • Number of children: Not on file   • Years of education: Not on file   • Highest education level: Not on file   Tobacco Use   • Smoking status: Current Every Day Smoker     Packs/day: 0.50   • Smokeless tobacco: Never Used   Substance and Sexual Activity   • Alcohol use: No   • Drug use: No   • Sexual activity: Defer           Objective   Physical Exam  Vitals signs and nursing note reviewed. Exam conducted with a chaperone present.   Constitutional:       Appearance: Normal appearance.   HENT:      Head: Normocephalic and atraumatic.      Right Ear: External ear normal.      Left Ear: External ear normal.      Nose: Nose normal.      Mouth/Throat:      Mouth: Mucous membranes are moist.      Dentition: Dental tenderness and dental caries present.       Pharynx: Oropharynx is clear.   Eyes:      Extraocular Movements: Extraocular movements intact.      Conjunctiva/sclera: Conjunctivae normal.      Pupils: Pupils are equal, round, and reactive to light.   Neck:      Musculoskeletal: Normal range of motion and neck supple.   Cardiovascular:      Rate and Rhythm: Normal rate and regular rhythm.      Pulses: Normal pulses.      Heart sounds: Normal heart sounds.   Pulmonary:      Effort: Pulmonary effort is normal.      Breath sounds: Normal breath sounds.   Abdominal:      General: Abdomen is flat. Bowel sounds are normal.   Musculoskeletal: Normal range of motion.   Skin:     General: Skin is warm and dry.      Capillary Refill: Capillary refill takes less than 2 seconds.   Neurological:      General: No focal deficit present.      Mental Status: She is alert and oriented to person, place, and time. Mental status is at baseline.   Psychiatric:         Mood and Affect: Mood normal.         Behavior: Behavior normal.         Thought Content: Thought content normal.         Judgment: Judgment normal.         Procedures           ED Course  ED Course as of Sep 18 1455   Fri Sep 18, 2020   1454 PT instructed to f/u with dentist. Discussed sx that would warrant return to the ED.     [ML]      ED Course User Index  [ML] Eladia Lucas PA                                           MDM  Number of Diagnoses or Management Options  Pain due to dental caries:   Risk of Complications, Morbidity, and/or Mortality  Presenting problems: minimal  Diagnostic procedures: minimal  Management options: minimal    Patient Progress  Patient progress: improved      Final diagnoses:   Pain due to dental caries            Eladia Lucas PA  09/18/20 1457

## 2021-01-13 ENCOUNTER — HOSPITAL ENCOUNTER (OUTPATIENT)
Dept: RESPIRATORY THERAPY | Facility: HOSPITAL | Age: 41
Discharge: HOME | End: 2021-01-13

## 2021-01-13 ENCOUNTER — HOSPITAL ENCOUNTER (OUTPATIENT)
Dept: GENERAL RADIOLOGY | Facility: HOSPITAL | Age: 41
Discharge: HOME | End: 2021-01-13

## 2021-01-13 ENCOUNTER — TRANSCRIBE ORDERS (OUTPATIENT)
Dept: ADMINISTRATIVE | Facility: HOSPITAL | Age: 41
End: 2021-01-13

## 2021-01-13 ENCOUNTER — LAB (OUTPATIENT)
Dept: LAB | Facility: HOSPITAL | Age: 41
End: 2021-01-13

## 2021-01-13 DIAGNOSIS — R07.9 CHEST PAIN, UNSPECIFIED TYPE: ICD-10-CM

## 2021-01-13 DIAGNOSIS — R07.9 CHEST PAIN, UNSPECIFIED TYPE: Primary | ICD-10-CM

## 2021-01-13 LAB
ALBUMIN SERPL-MCNC: 4.5 G/DL (ref 3.5–5.2)
ALBUMIN/GLOB SERPL: 2 G/DL
ALP SERPL-CCNC: 63 U/L (ref 39–117)
ALT SERPL W P-5'-P-CCNC: 8 U/L (ref 1–33)
ANION GAP SERPL CALCULATED.3IONS-SCNC: 7.6 MMOL/L (ref 5–15)
AST SERPL-CCNC: 10 U/L (ref 1–32)
BASOPHILS # BLD AUTO: 0.06 10*3/MM3 (ref 0–0.2)
BASOPHILS NFR BLD AUTO: 0.7 % (ref 0–1.5)
BILIRUB SERPL-MCNC: 0.3 MG/DL (ref 0–1.2)
BUN SERPL-MCNC: 11 MG/DL (ref 6–20)
BUN/CREAT SERPL: 14.5 (ref 7–25)
CALCIUM SPEC-SCNC: 9.4 MG/DL (ref 8.6–10.5)
CHLORIDE SERPL-SCNC: 101 MMOL/L (ref 98–107)
CHOLEST SERPL-MCNC: 148 MG/DL (ref 0–200)
CO2 SERPL-SCNC: 27.4 MMOL/L (ref 22–29)
CREAT SERPL-MCNC: 0.76 MG/DL (ref 0.57–1)
DEPRECATED RDW RBC AUTO: 43.3 FL (ref 37–54)
EOSINOPHIL # BLD AUTO: 0.3 10*3/MM3 (ref 0–0.4)
EOSINOPHIL NFR BLD AUTO: 3.5 % (ref 0.3–6.2)
ERYTHROCYTE [DISTWIDTH] IN BLOOD BY AUTOMATED COUNT: 13.7 % (ref 12.3–15.4)
GFR SERPL CREATININE-BSD FRML MDRD: 84 ML/MIN/1.73
GLOBULIN UR ELPH-MCNC: 2.3 GM/DL
GLUCOSE SERPL-MCNC: 84 MG/DL (ref 65–99)
HCT VFR BLD AUTO: 39.1 % (ref 34–46.6)
HDLC SERPL-MCNC: 68 MG/DL (ref 40–60)
HGB BLD-MCNC: 12.7 G/DL (ref 12–15.9)
IMM GRANULOCYTES # BLD AUTO: 0.04 10*3/MM3 (ref 0–0.05)
IMM GRANULOCYTES NFR BLD AUTO: 0.5 % (ref 0–0.5)
LDLC SERPL CALC-MCNC: 65 MG/DL (ref 0–100)
LDLC/HDLC SERPL: 0.94 {RATIO}
LYMPHOCYTES # BLD AUTO: 1.38 10*3/MM3 (ref 0.7–3.1)
LYMPHOCYTES NFR BLD AUTO: 16.3 % (ref 19.6–45.3)
MCH RBC QN AUTO: 28 PG (ref 26.6–33)
MCHC RBC AUTO-ENTMCNC: 32.5 G/DL (ref 31.5–35.7)
MCV RBC AUTO: 86.3 FL (ref 79–97)
MONOCYTES # BLD AUTO: 0.62 10*3/MM3 (ref 0.1–0.9)
MONOCYTES NFR BLD AUTO: 7.3 % (ref 5–12)
NEUTROPHILS NFR BLD AUTO: 6.07 10*3/MM3 (ref 1.7–7)
NEUTROPHILS NFR BLD AUTO: 71.7 % (ref 42.7–76)
NRBC BLD AUTO-RTO: 0 /100 WBC (ref 0–0.2)
PLATELET # BLD AUTO: 281 10*3/MM3 (ref 140–450)
PMV BLD AUTO: 10.2 FL (ref 6–12)
POTASSIUM SERPL-SCNC: 3.9 MMOL/L (ref 3.5–5.2)
PROT SERPL-MCNC: 6.8 G/DL (ref 6–8.5)
QT INTERVAL: 382 MS
QTC INTERVAL: 435 MS
RBC # BLD AUTO: 4.53 10*6/MM3 (ref 3.77–5.28)
SODIUM SERPL-SCNC: 136 MMOL/L (ref 136–145)
TRIGL SERPL-MCNC: 79 MG/DL (ref 0–150)
TSH SERPL DL<=0.05 MIU/L-ACNC: 1.22 UIU/ML (ref 0.27–4.2)
VLDLC SERPL-MCNC: 15 MG/DL (ref 5–40)
WBC # BLD AUTO: 8.47 10*3/MM3 (ref 3.4–10.8)

## 2021-01-13 PROCEDURE — 71046 X-RAY EXAM CHEST 2 VIEWS: CPT

## 2021-01-13 PROCEDURE — 93010 ELECTROCARDIOGRAM REPORT: CPT | Performed by: INTERNAL MEDICINE

## 2021-01-13 PROCEDURE — 71046 X-RAY EXAM CHEST 2 VIEWS: CPT | Performed by: RADIOLOGY

## 2021-01-13 PROCEDURE — 80061 LIPID PANEL: CPT

## 2021-01-13 PROCEDURE — 93005 ELECTROCARDIOGRAM TRACING: CPT | Performed by: NURSE PRACTITIONER

## 2021-01-13 PROCEDURE — 36415 COLL VENOUS BLD VENIPUNCTURE: CPT

## 2021-01-13 PROCEDURE — 80050 GENERAL HEALTH PANEL: CPT

## 2023-02-08 ENCOUNTER — HOSPITAL ENCOUNTER (EMERGENCY)
Facility: HOSPITAL | Age: 43
Discharge: HOME | End: 2023-02-08
Attending: STUDENT IN AN ORGANIZED HEALTH CARE EDUCATION/TRAINING PROGRAM | Admitting: STUDENT IN AN ORGANIZED HEALTH CARE EDUCATION/TRAINING PROGRAM
Payer: COMMERCIAL

## 2023-02-08 ENCOUNTER — APPOINTMENT (OUTPATIENT)
Dept: CT IMAGING | Facility: HOSPITAL | Age: 43
End: 2023-02-08
Payer: COMMERCIAL

## 2023-02-08 VITALS
BODY MASS INDEX: 24.11 KG/M2 | HEART RATE: 89 BPM | RESPIRATION RATE: 14 BRPM | WEIGHT: 150 LBS | SYSTOLIC BLOOD PRESSURE: 101 MMHG | OXYGEN SATURATION: 98 % | HEIGHT: 66 IN | TEMPERATURE: 98.7 F | DIASTOLIC BLOOD PRESSURE: 78 MMHG

## 2023-02-08 DIAGNOSIS — R10.9 LEFT FLANK PAIN: Primary | ICD-10-CM

## 2023-02-08 LAB
ALBUMIN SERPL-MCNC: 4 G/DL (ref 3.5–5.2)
ALBUMIN/GLOB SERPL: 1.3 G/DL
ALP SERPL-CCNC: 90 U/L (ref 39–117)
ALT SERPL W P-5'-P-CCNC: 15 U/L (ref 1–33)
ANION GAP SERPL CALCULATED.3IONS-SCNC: 10.9 MMOL/L (ref 5–15)
AST SERPL-CCNC: 22 U/L (ref 1–32)
B-HCG UR QL: NEGATIVE
BACTERIA UR QL AUTO: ABNORMAL /HPF
BASOPHILS # BLD AUTO: 0.07 10*3/MM3 (ref 0–0.2)
BASOPHILS NFR BLD AUTO: 0.5 % (ref 0–1.5)
BILIRUB SERPL-MCNC: 0.3 MG/DL (ref 0–1.2)
BILIRUB UR QL STRIP: NEGATIVE
BUN SERPL-MCNC: 17 MG/DL (ref 6–20)
BUN/CREAT SERPL: 16.7 (ref 7–25)
CALCIUM SPEC-SCNC: 9.6 MG/DL (ref 8.6–10.5)
CHLORIDE SERPL-SCNC: 101 MMOL/L (ref 98–107)
CLARITY UR: CLEAR
CO2 SERPL-SCNC: 24.1 MMOL/L (ref 22–29)
COLOR UR: YELLOW
CREAT SERPL-MCNC: 1.02 MG/DL (ref 0.57–1)
CRP SERPL-MCNC: 3.5 MG/DL (ref 0–0.5)
DEPRECATED RDW RBC AUTO: 43.9 FL (ref 37–54)
EGFRCR SERPLBLD CKD-EPI 2021: 70.1 ML/MIN/1.73
EOSINOPHIL # BLD AUTO: 0.68 10*3/MM3 (ref 0–0.4)
EOSINOPHIL NFR BLD AUTO: 5.3 % (ref 0.3–6.2)
ERYTHROCYTE [DISTWIDTH] IN BLOOD BY AUTOMATED COUNT: 13.1 % (ref 12.3–15.4)
GLOBULIN UR ELPH-MCNC: 3.2 GM/DL
GLUCOSE SERPL-MCNC: 117 MG/DL (ref 65–99)
GLUCOSE UR STRIP-MCNC: NEGATIVE MG/DL
HCT VFR BLD AUTO: 41.6 % (ref 34–46.6)
HGB BLD-MCNC: 13.4 G/DL (ref 12–15.9)
HGB UR QL STRIP.AUTO: ABNORMAL
HOLD SPECIMEN: NORMAL
HOLD SPECIMEN: NORMAL
HYALINE CASTS UR QL AUTO: ABNORMAL /LPF
IMM GRANULOCYTES # BLD AUTO: 0.04 10*3/MM3 (ref 0–0.05)
IMM GRANULOCYTES NFR BLD AUTO: 0.3 % (ref 0–0.5)
KETONES UR QL STRIP: NEGATIVE
LEUKOCYTE ESTERASE UR QL STRIP.AUTO: ABNORMAL
LYMPHOCYTES # BLD AUTO: 0.89 10*3/MM3 (ref 0.7–3.1)
LYMPHOCYTES NFR BLD AUTO: 7 % (ref 19.6–45.3)
MCH RBC QN AUTO: 29.4 PG (ref 26.6–33)
MCHC RBC AUTO-ENTMCNC: 32.2 G/DL (ref 31.5–35.7)
MCV RBC AUTO: 91.2 FL (ref 79–97)
MONOCYTES # BLD AUTO: 1.03 10*3/MM3 (ref 0.1–0.9)
MONOCYTES NFR BLD AUTO: 8.1 % (ref 5–12)
NEUTROPHILS NFR BLD AUTO: 10.05 10*3/MM3 (ref 1.7–7)
NEUTROPHILS NFR BLD AUTO: 78.8 % (ref 42.7–76)
NITRITE UR QL STRIP: NEGATIVE
NRBC BLD AUTO-RTO: 0 /100 WBC (ref 0–0.2)
PH UR STRIP.AUTO: 6 [PH] (ref 5–8)
PLATELET # BLD AUTO: 288 10*3/MM3 (ref 140–450)
PMV BLD AUTO: 9.5 FL (ref 6–12)
POTASSIUM SERPL-SCNC: 4.2 MMOL/L (ref 3.5–5.2)
PROT SERPL-MCNC: 7.2 G/DL (ref 6–8.5)
PROT UR QL STRIP: ABNORMAL
RBC # BLD AUTO: 4.56 10*6/MM3 (ref 3.77–5.28)
RBC # UR STRIP: ABNORMAL /HPF
REF LAB TEST METHOD: ABNORMAL
SODIUM SERPL-SCNC: 136 MMOL/L (ref 136–145)
SP GR UR STRIP: 1.01 (ref 1–1.03)
SQUAMOUS #/AREA URNS HPF: ABNORMAL /HPF
UROBILINOGEN UR QL STRIP: ABNORMAL
WBC # UR STRIP: ABNORMAL /HPF
WBC NRBC COR # BLD: 12.76 10*3/MM3 (ref 3.4–10.8)
WHOLE BLOOD HOLD COAG: NORMAL
WHOLE BLOOD HOLD SPECIMEN: NORMAL

## 2023-02-08 PROCEDURE — 86140 C-REACTIVE PROTEIN: CPT | Performed by: PHYSICIAN ASSISTANT

## 2023-02-08 PROCEDURE — 87086 URINE CULTURE/COLONY COUNT: CPT | Performed by: PHYSICIAN ASSISTANT

## 2023-02-08 PROCEDURE — 25010000002 KETOROLAC TROMETHAMINE PER 15 MG: Performed by: STUDENT IN AN ORGANIZED HEALTH CARE EDUCATION/TRAINING PROGRAM

## 2023-02-08 PROCEDURE — 36415 COLL VENOUS BLD VENIPUNCTURE: CPT

## 2023-02-08 PROCEDURE — 99283 EMERGENCY DEPT VISIT LOW MDM: CPT

## 2023-02-08 PROCEDURE — 96374 THER/PROPH/DIAG INJ IV PUSH: CPT

## 2023-02-08 PROCEDURE — 81001 URINALYSIS AUTO W/SCOPE: CPT | Performed by: PHYSICIAN ASSISTANT

## 2023-02-08 PROCEDURE — 25010000002 ONDANSETRON PER 1 MG: Performed by: STUDENT IN AN ORGANIZED HEALTH CARE EDUCATION/TRAINING PROGRAM

## 2023-02-08 PROCEDURE — 80053 COMPREHEN METABOLIC PANEL: CPT | Performed by: PHYSICIAN ASSISTANT

## 2023-02-08 PROCEDURE — 81025 URINE PREGNANCY TEST: CPT | Performed by: PHYSICIAN ASSISTANT

## 2023-02-08 PROCEDURE — 25010000002 MORPHINE PER 10 MG: Performed by: STUDENT IN AN ORGANIZED HEALTH CARE EDUCATION/TRAINING PROGRAM

## 2023-02-08 PROCEDURE — 85025 COMPLETE CBC W/AUTO DIFF WBC: CPT | Performed by: PHYSICIAN ASSISTANT

## 2023-02-08 PROCEDURE — 96375 TX/PRO/DX INJ NEW DRUG ADDON: CPT

## 2023-02-08 PROCEDURE — 74176 CT ABD & PELVIS W/O CONTRAST: CPT

## 2023-02-08 RX ORDER — KETOROLAC TROMETHAMINE 30 MG/ML
15 INJECTION, SOLUTION INTRAMUSCULAR; INTRAVENOUS ONCE
Status: DISCONTINUED | OUTPATIENT
Start: 2023-02-08 | End: 2023-02-08

## 2023-02-08 RX ORDER — SODIUM CHLORIDE 0.9 % (FLUSH) 0.9 %
10 SYRINGE (ML) INJECTION AS NEEDED
Status: DISCONTINUED | OUTPATIENT
Start: 2023-02-08 | End: 2023-02-09 | Stop reason: HOSPADM

## 2023-02-08 RX ORDER — ONDANSETRON 2 MG/ML
4 INJECTION INTRAMUSCULAR; INTRAVENOUS ONCE
Status: COMPLETED | OUTPATIENT
Start: 2023-02-08 | End: 2023-02-08

## 2023-02-08 RX ORDER — MORPHINE SULFATE 2 MG/ML
2 INJECTION, SOLUTION INTRAMUSCULAR; INTRAVENOUS ONCE
Status: COMPLETED | OUTPATIENT
Start: 2023-02-08 | End: 2023-02-08

## 2023-02-08 RX ORDER — IBUPROFEN 400 MG/1
800 TABLET ORAL ONCE
Status: COMPLETED | OUTPATIENT
Start: 2023-02-08 | End: 2023-02-08

## 2023-02-08 RX ORDER — KETOROLAC TROMETHAMINE 30 MG/ML
30 INJECTION, SOLUTION INTRAMUSCULAR; INTRAVENOUS ONCE
Status: COMPLETED | OUTPATIENT
Start: 2023-02-08 | End: 2023-02-08

## 2023-02-08 RX ORDER — CEPHALEXIN 500 MG/1
500 CAPSULE ORAL 2 TIMES DAILY
Qty: 20 CAPSULE | Refills: 0 | Status: ON HOLD | OUTPATIENT
Start: 2023-02-08 | End: 2023-04-09

## 2023-02-08 RX ADMIN — ONDANSETRON 4 MG: 2 INJECTION INTRAMUSCULAR; INTRAVENOUS at 22:54

## 2023-02-08 RX ADMIN — KETOROLAC TROMETHAMINE 30 MG: 30 INJECTION, SOLUTION INTRAMUSCULAR; INTRAVENOUS at 22:55

## 2023-02-08 RX ADMIN — MORPHINE SULFATE 2 MG: 2 INJECTION, SOLUTION INTRAMUSCULAR; INTRAVENOUS at 22:55

## 2023-02-08 RX ADMIN — IBUPROFEN 800 MG: 400 TABLET, FILM COATED ORAL at 21:14

## 2023-02-09 LAB — BACTERIA SPEC AEROBE CULT: NO GROWTH

## 2023-02-09 NOTE — ED PROVIDER NOTES
Subjective     History provided by:  Patient  Flank Pain  Pain location:  L flank  Pain quality: aching, sharp and stabbing    Pain radiates to:  Does not radiate  Pain severity:  Moderate  Onset quality:  Sudden  Duration:  6 hours  Timing:  Constant  Progression:  Worsening  Chronicity:  New  Relieved by:  Nothing  Associated symptoms: nausea and vomiting    Associated symptoms: no chest pain, no dysuria and no fever        Review of Systems   Constitutional: Negative.  Negative for fever.   HENT: Negative.    Respiratory: Negative.    Cardiovascular: Negative.  Negative for chest pain.   Gastrointestinal: Positive for nausea and vomiting. Negative for abdominal pain.   Endocrine: Negative.    Genitourinary: Positive for flank pain. Negative for dysuria.   Skin: Negative.    Neurological: Negative.    Psychiatric/Behavioral: Negative.    All other systems reviewed and are negative.      No past medical history on file.    No Known Allergies    Past Surgical History:   Procedure Laterality Date   • EXPLORATORY LAPAROTOMY         No family history on file.    Social History     Socioeconomic History   • Marital status: Legally    Tobacco Use   • Smoking status: Every Day     Packs/day: 0.50     Types: Cigarettes   • Smokeless tobacco: Never   Substance and Sexual Activity   • Alcohol use: No   • Drug use: No   • Sexual activity: Defer           Objective   Physical Exam  Vitals and nursing note reviewed.   Constitutional:       General: She is not in acute distress.     Appearance: She is well-developed. She is not diaphoretic.   HENT:      Head: Normocephalic and atraumatic.      Right Ear: External ear normal.      Left Ear: External ear normal.      Nose: Nose normal.   Eyes:      Conjunctiva/sclera: Conjunctivae normal.   Neck:      Vascular: No JVD.      Trachea: No tracheal deviation.   Cardiovascular:      Rate and Rhythm: Normal rate.      Heart sounds: No murmur heard.  Pulmonary:      Effort:  Pulmonary effort is normal. No respiratory distress.      Breath sounds: No wheezing.   Abdominal:      Palpations: Abdomen is soft.      Tenderness: There is no abdominal tenderness. There is left CVA tenderness.   Musculoskeletal:         General: No deformity. Normal range of motion.      Cervical back: Normal range of motion and neck supple.   Skin:     General: Skin is warm and dry.      Coloration: Skin is not pale.      Findings: No erythema or rash.   Neurological:      Mental Status: She is alert and oriented to person, place, and time.      Cranial Nerves: No cranial nerve deficit.   Psychiatric:         Behavior: Behavior normal.         Thought Content: Thought content normal.         Procedures           ED Course                                           Medical Decision Making  42-year-old with acute onset of left-sided flank pain.    Left flank pain: acute illness or injury     Details: Work-up is unremarkable.  CT scan does not show any pyelonephritis.  Patient does have 1+ bacteria in her urine which patient will be started on antibiotic given physical exam and her symptoms.  Amount and/or Complexity of Data Reviewed  Labs: ordered.  Radiology: ordered. Decision-making details documented in ED Course.      Risk  Prescription drug management.          Final diagnoses:   Left flank pain       ED Disposition  ED Disposition     ED Disposition   Discharge    Condition   Stable    Comment   --             Romy Lieberman, APRN  [address] [facility] [address] [id number]  [phone]    Schedule an appointment as soon as possible for a visit            Medication List      New Prescriptions    cephalexin 500 MG capsule  Commonly known as: KEFLEX  Take 1 capsule by mouth 2 (Two) Times a Day.           Where to Get Your Medications      These medications were sent to [place], [place] - [address] - [phone]  - [phone] FX  [address] [facility] [address]    Phone:  [phone]   · cephalexin 500 MG capsule          Hussein Shetty II, PA  02/08/23 0060

## 2023-02-09 NOTE — ED NOTES
Pt came to triage and c/o severe pain, asking for ibuprofen. ED provider made aware, order to be placed.

## 2023-02-09 NOTE — ED NOTES
Pt provided urine collection supplies and advised the need for sample. Pt verbalized understanding

## 2023-02-09 NOTE — ED NOTES
MEDICAL SCREENING:    Reason for Visit: flank pain x 2 days and worsening. Started on Macrobid yesterday.    Patient initially seen in triage.  The patient was advised further evaluation and diagnostic testing will be needed, some of the treatment and testing will be initiated in the lobby in order to begin the process.  The patient will be returned to the waiting area for the time being and possibly be re-assessed by a subsequent ED provider.  The patient will be brought back to the treatment area in as timely manner as possible.         Da Newman PA-C  02/08/23 2020

## 2023-04-09 ENCOUNTER — APPOINTMENT (OUTPATIENT)
Dept: CT IMAGING | Facility: HOSPITAL | Age: 43
DRG: 872 | End: 2023-04-09
Payer: COMMERCIAL

## 2023-04-09 ENCOUNTER — HOSPITAL ENCOUNTER (INPATIENT)
Facility: HOSPITAL | Age: 43
LOS: 2 days | Discharge: HOME | DRG: 872 | End: 2023-04-11
Attending: STUDENT IN AN ORGANIZED HEALTH CARE EDUCATION/TRAINING PROGRAM | Admitting: INTERNAL MEDICINE
Payer: COMMERCIAL

## 2023-04-09 ENCOUNTER — APPOINTMENT (OUTPATIENT)
Dept: GENERAL RADIOLOGY | Facility: HOSPITAL | Age: 43
DRG: 872 | End: 2023-04-09
Payer: COMMERCIAL

## 2023-04-09 DIAGNOSIS — N10 ACUTE PYELONEPHRITIS: ICD-10-CM

## 2023-04-09 DIAGNOSIS — I95.9 HYPOTENSION, UNSPECIFIED HYPOTENSION TYPE: ICD-10-CM

## 2023-04-09 DIAGNOSIS — A41.9 SEPSIS, DUE TO UNSPECIFIED ORGANISM, UNSPECIFIED WHETHER ACUTE ORGAN DYSFUNCTION PRESENT: Primary | ICD-10-CM

## 2023-04-09 PROBLEM — R65.20 SEVERE SEPSIS: Status: ACTIVE | Noted: 2023-04-09

## 2023-04-09 LAB
ALBUMIN SERPL-MCNC: 3.8 G/DL (ref 3.5–5.2)
ALBUMIN/GLOB SERPL: 1.4 G/DL
ALP SERPL-CCNC: 69 U/L (ref 39–117)
ALT SERPL W P-5'-P-CCNC: 18 U/L (ref 1–33)
AMPHET+METHAMPHET UR QL: POSITIVE
AMPHETAMINES UR QL: NEGATIVE
ANION GAP SERPL CALCULATED.3IONS-SCNC: 12.4 MMOL/L (ref 5–15)
AST SERPL-CCNC: 22 U/L (ref 1–32)
BACTERIA UR QL AUTO: ABNORMAL /HPF
BARBITURATES UR QL SCN: NEGATIVE
BASOPHILS # BLD AUTO: 0.04 10*3/MM3 (ref 0–0.2)
BASOPHILS NFR BLD AUTO: 0.3 % (ref 0–1.5)
BENZODIAZ UR QL SCN: NEGATIVE
BILIRUB SERPL-MCNC: 0.6 MG/DL (ref 0–1.2)
BILIRUB UR QL STRIP: ABNORMAL
BUN SERPL-MCNC: 11 MG/DL (ref 6–20)
BUN/CREAT SERPL: 13.1 (ref 7–25)
BUPRENORPHINE SERPL-MCNC: NEGATIVE NG/ML
CALCIUM SPEC-SCNC: 9.5 MG/DL (ref 8.6–10.5)
CANNABINOIDS SERPL QL: NEGATIVE
CHLORIDE SERPL-SCNC: 104 MMOL/L (ref 98–107)
CLARITY UR: ABNORMAL
CO2 SERPL-SCNC: 21.6 MMOL/L (ref 22–29)
COCAINE UR QL: NEGATIVE
COLOR UR: ABNORMAL
CREAT SERPL-MCNC: 0.84 MG/DL (ref 0.57–1)
D-LACTATE SERPL-SCNC: 0.8 MMOL/L (ref 0.5–2)
DEPRECATED RDW RBC AUTO: 47.5 FL (ref 37–54)
EGFRCR SERPLBLD CKD-EPI 2021: 88.6 ML/MIN/1.73
EOSINOPHIL # BLD AUTO: 0.5 10*3/MM3 (ref 0–0.4)
EOSINOPHIL NFR BLD AUTO: 3.3 % (ref 0.3–6.2)
ERYTHROCYTE [DISTWIDTH] IN BLOOD BY AUTOMATED COUNT: 14.3 % (ref 12.3–15.4)
FLUAV RNA RESP QL NAA+PROBE: NOT DETECTED
FLUBV RNA RESP QL NAA+PROBE: NOT DETECTED
GLOBULIN UR ELPH-MCNC: 2.8 GM/DL
GLUCOSE SERPL-MCNC: 118 MG/DL (ref 65–99)
GLUCOSE UR STRIP-MCNC: NEGATIVE MG/DL
HBA1C MFR BLD: 4.5 % (ref 4.8–5.6)
HCG SERPL QL: NEGATIVE
HCT VFR BLD AUTO: 38.4 % (ref 34–46.6)
HGB BLD-MCNC: 12.4 G/DL (ref 12–15.9)
HGB UR QL STRIP.AUTO: ABNORMAL
HOLD SPECIMEN: NORMAL
HYALINE CASTS UR QL AUTO: ABNORMAL /LPF
IMM GRANULOCYTES # BLD AUTO: 0.12 10*3/MM3 (ref 0–0.05)
IMM GRANULOCYTES NFR BLD AUTO: 0.8 % (ref 0–0.5)
KETONES UR QL STRIP: ABNORMAL
LEUKOCYTE ESTERASE UR QL STRIP.AUTO: ABNORMAL
LYMPHOCYTES # BLD AUTO: 0.37 10*3/MM3 (ref 0.7–3.1)
LYMPHOCYTES NFR BLD AUTO: 2.4 % (ref 19.6–45.3)
MAGNESIUM SERPL-MCNC: 1.7 MG/DL (ref 1.6–2.6)
MCH RBC QN AUTO: 29.2 PG (ref 26.6–33)
MCHC RBC AUTO-ENTMCNC: 32.3 G/DL (ref 31.5–35.7)
MCV RBC AUTO: 90.4 FL (ref 79–97)
METHADONE UR QL SCN: NEGATIVE
MONOCYTES # BLD AUTO: 1.01 10*3/MM3 (ref 0.1–0.9)
MONOCYTES NFR BLD AUTO: 6.6 % (ref 5–12)
NEUTROPHILS NFR BLD AUTO: 13.3 10*3/MM3 (ref 1.7–7)
NEUTROPHILS NFR BLD AUTO: 86.6 % (ref 42.7–76)
NITRITE UR QL STRIP: POSITIVE
NRBC BLD AUTO-RTO: 0 /100 WBC (ref 0–0.2)
OPIATES UR QL: POSITIVE
OXYCODONE UR QL SCN: NEGATIVE
PCP UR QL SCN: NEGATIVE
PH UR STRIP.AUTO: 5.5 [PH] (ref 5–8)
PLATELET # BLD AUTO: 201 10*3/MM3 (ref 140–450)
PMV BLD AUTO: 9.3 FL (ref 6–12)
POTASSIUM SERPL-SCNC: 3.2 MMOL/L (ref 3.5–5.2)
PROPOXYPH UR QL: NEGATIVE
PROT SERPL-MCNC: 6.6 G/DL (ref 6–8.5)
PROT UR QL STRIP: ABNORMAL
RBC # BLD AUTO: 4.25 10*6/MM3 (ref 3.77–5.28)
RBC # UR STRIP: ABNORMAL /HPF
REF LAB TEST METHOD: ABNORMAL
SARS-COV-2 RNA RESP QL NAA+PROBE: NOT DETECTED
SODIUM SERPL-SCNC: 138 MMOL/L (ref 136–145)
SP GR UR STRIP: 1.03 (ref 1–1.03)
SQUAMOUS #/AREA URNS HPF: ABNORMAL /HPF
TRICYCLICS UR QL SCN: NEGATIVE
TSH SERPL DL<=0.05 MIU/L-ACNC: 0.54 UIU/ML (ref 0.27–4.2)
UROBILINOGEN UR QL STRIP: ABNORMAL
WBC # UR STRIP: ABNORMAL /HPF
WBC NRBC COR # BLD: 15.34 10*3/MM3 (ref 3.4–10.8)
WHOLE BLOOD HOLD COAG: NORMAL
WHOLE BLOOD HOLD SPECIMEN: NORMAL

## 2023-04-09 PROCEDURE — 87086 URINE CULTURE/COLONY COUNT: CPT | Performed by: PHYSICIAN ASSISTANT

## 2023-04-09 PROCEDURE — 83605 ASSAY OF LACTIC ACID: CPT | Performed by: PHYSICIAN ASSISTANT

## 2023-04-09 PROCEDURE — 0 MAGNESIUM SULFATE 4 GM/100ML SOLUTION: Performed by: INTERNAL MEDICINE

## 2023-04-09 PROCEDURE — 25010000002 KETOROLAC TROMETHAMINE PER 15 MG: Performed by: PHYSICIAN ASSISTANT

## 2023-04-09 PROCEDURE — 93005 ELECTROCARDIOGRAM TRACING: CPT | Performed by: INTERNAL MEDICINE

## 2023-04-09 PROCEDURE — 25010000002 MORPHINE PER 10 MG: Performed by: STUDENT IN AN ORGANIZED HEALTH CARE EDUCATION/TRAINING PROGRAM

## 2023-04-09 PROCEDURE — 81001 URINALYSIS AUTO W/SCOPE: CPT | Performed by: PHYSICIAN ASSISTANT

## 2023-04-09 PROCEDURE — 74176 CT ABD & PELVIS W/O CONTRAST: CPT

## 2023-04-09 PROCEDURE — 99223 1ST HOSP IP/OBS HIGH 75: CPT | Performed by: INTERNAL MEDICINE

## 2023-04-09 PROCEDURE — 87040 BLOOD CULTURE FOR BACTERIA: CPT | Performed by: PHYSICIAN ASSISTANT

## 2023-04-09 PROCEDURE — 99285 EMERGENCY DEPT VISIT HI MDM: CPT

## 2023-04-09 PROCEDURE — 71045 X-RAY EXAM CHEST 1 VIEW: CPT

## 2023-04-09 PROCEDURE — 87150 DNA/RNA AMPLIFIED PROBE: CPT | Performed by: PHYSICIAN ASSISTANT

## 2023-04-09 PROCEDURE — 93010 ELECTROCARDIOGRAM REPORT: CPT | Performed by: INTERNAL MEDICINE

## 2023-04-09 PROCEDURE — 83735 ASSAY OF MAGNESIUM: CPT | Performed by: INTERNAL MEDICINE

## 2023-04-09 PROCEDURE — 87636 SARSCOV2 & INF A&B AMP PRB: CPT | Performed by: PHYSICIAN ASSISTANT

## 2023-04-09 PROCEDURE — 83036 HEMOGLOBIN GLYCOSYLATED A1C: CPT | Performed by: INTERNAL MEDICINE

## 2023-04-09 PROCEDURE — 80050 GENERAL HEALTH PANEL: CPT | Performed by: PHYSICIAN ASSISTANT

## 2023-04-09 PROCEDURE — 36415 COLL VENOUS BLD VENIPUNCTURE: CPT

## 2023-04-09 PROCEDURE — 84703 CHORIONIC GONADOTROPIN ASSAY: CPT | Performed by: PHYSICIAN ASSISTANT

## 2023-04-09 PROCEDURE — 80306 DRUG TEST PRSMV INSTRMNT: CPT | Performed by: PHYSICIAN ASSISTANT

## 2023-04-09 PROCEDURE — 25010000002 ONDANSETRON PER 1 MG: Performed by: PHYSICIAN ASSISTANT

## 2023-04-09 PROCEDURE — 87186 SC STD MICRODIL/AGAR DIL: CPT | Performed by: PHYSICIAN ASSISTANT

## 2023-04-09 PROCEDURE — 25010000002 CEFTRIAXONE PER 250 MG: Performed by: PHYSICIAN ASSISTANT

## 2023-04-09 PROCEDURE — 25010000002 KETOROLAC TROMETHAMINE PER 15 MG: Performed by: STUDENT IN AN ORGANIZED HEALTH CARE EDUCATION/TRAINING PROGRAM

## 2023-04-09 RX ORDER — OXYCODONE HYDROCHLORIDE 5 MG/1
5 TABLET ORAL EVERY 4 HOURS PRN
Status: DISCONTINUED | OUTPATIENT
Start: 2023-04-09 | End: 2023-04-11 | Stop reason: HOSPADM

## 2023-04-09 RX ORDER — NITROFURANTOIN 25; 75 MG/1; MG/1
100 CAPSULE ORAL 2 TIMES DAILY WITH MEALS
COMMUNITY
Start: 2023-04-08 | End: 2023-04-11 | Stop reason: HOSPADM

## 2023-04-09 RX ORDER — POTASSIUM CHLORIDE 20 MEQ/1
40 TABLET, EXTENDED RELEASE ORAL AS NEEDED
Status: DISCONTINUED | OUTPATIENT
Start: 2023-04-09 | End: 2023-04-11 | Stop reason: HOSPADM

## 2023-04-09 RX ORDER — MAGNESIUM SULFATE HEPTAHYDRATE 40 MG/ML
4 INJECTION, SOLUTION INTRAVENOUS AS NEEDED
Status: DISCONTINUED | OUTPATIENT
Start: 2023-04-09 | End: 2023-04-11 | Stop reason: HOSPADM

## 2023-04-09 RX ORDER — KETOROLAC TROMETHAMINE 30 MG/ML
15 INJECTION, SOLUTION INTRAMUSCULAR; INTRAVENOUS ONCE
Status: COMPLETED | OUTPATIENT
Start: 2023-04-09 | End: 2023-04-09

## 2023-04-09 RX ORDER — KETOROLAC TROMETHAMINE 30 MG/ML
30 INJECTION, SOLUTION INTRAMUSCULAR; INTRAVENOUS EVERY 6 HOURS PRN
Status: DISCONTINUED | OUTPATIENT
Start: 2023-04-09 | End: 2023-04-11 | Stop reason: HOSPADM

## 2023-04-09 RX ORDER — SUMATRIPTAN 50 MG/1
1 TABLET, FILM COATED ORAL ONCE AS NEEDED
COMMUNITY
Start: 2023-03-17

## 2023-04-09 RX ORDER — SODIUM CHLORIDE 0.9 % (FLUSH) 0.9 %
3 SYRINGE (ML) INJECTION EVERY 12 HOURS SCHEDULED
Status: DISCONTINUED | OUTPATIENT
Start: 2023-04-09 | End: 2023-04-11 | Stop reason: HOSPADM

## 2023-04-09 RX ORDER — CALCIUM CARBONATE 200(500)MG
2 TABLET,CHEWABLE ORAL ONCE
Status: COMPLETED | OUTPATIENT
Start: 2023-04-09 | End: 2023-04-09

## 2023-04-09 RX ORDER — LEVOCETIRIZINE DIHYDROCHLORIDE 5 MG/1
1 TABLET, FILM COATED ORAL DAILY
COMMUNITY
Start: 2023-03-17

## 2023-04-09 RX ORDER — SODIUM CHLORIDE 0.9 % (FLUSH) 0.9 %
3-10 SYRINGE (ML) INJECTION AS NEEDED
Status: DISCONTINUED | OUTPATIENT
Start: 2023-04-09 | End: 2023-04-11 | Stop reason: HOSPADM

## 2023-04-09 RX ORDER — POTASSIUM CHLORIDE 1.5 G/1.77G
40 POWDER, FOR SOLUTION ORAL AS NEEDED
Status: DISCONTINUED | OUTPATIENT
Start: 2023-04-09 | End: 2023-04-11 | Stop reason: HOSPADM

## 2023-04-09 RX ORDER — ACETAMINOPHEN 500 MG
1000 TABLET ORAL EVERY 4 HOURS PRN
COMMUNITY

## 2023-04-09 RX ORDER — MAGNESIUM SULFATE HEPTAHYDRATE 40 MG/ML
2 INJECTION, SOLUTION INTRAVENOUS AS NEEDED
Status: DISCONTINUED | OUTPATIENT
Start: 2023-04-09 | End: 2023-04-11 | Stop reason: HOSPADM

## 2023-04-09 RX ORDER — SUMATRIPTAN 50 MG/1
50 TABLET, FILM COATED ORAL ONCE AS NEEDED
Status: DISCONTINUED | OUTPATIENT
Start: 2023-04-09 | End: 2023-04-11 | Stop reason: HOSPADM

## 2023-04-09 RX ORDER — ONDANSETRON 2 MG/ML
4 INJECTION INTRAMUSCULAR; INTRAVENOUS ONCE
Status: COMPLETED | OUTPATIENT
Start: 2023-04-09 | End: 2023-04-09

## 2023-04-09 RX ORDER — NITROFURANTOIN 25; 75 MG/1; MG/1
100 CAPSULE ORAL 2 TIMES DAILY WITH MEALS
Status: CANCELLED | OUTPATIENT
Start: 2023-04-09 | End: 2023-04-17

## 2023-04-09 RX ORDER — ACETAMINOPHEN 500 MG
1000 TABLET ORAL 3 TIMES DAILY
Status: DISCONTINUED | OUTPATIENT
Start: 2023-04-09 | End: 2023-04-11 | Stop reason: HOSPADM

## 2023-04-09 RX ORDER — NOREPINEPHRINE BIT/0.9 % NACL 8 MG/250ML
.02-.3 INFUSION BOTTLE (ML) INTRAVENOUS
Status: DISCONTINUED | OUTPATIENT
Start: 2023-04-10 | End: 2023-04-11 | Stop reason: HOSPADM

## 2023-04-09 RX ORDER — PHENAZOPYRIDINE HYDROCHLORIDE 200 MG/1
1 TABLET, FILM COATED ORAL 3 TIMES DAILY
COMMUNITY
Start: 2023-04-08 | End: 2023-04-11 | Stop reason: HOSPADM

## 2023-04-09 RX ORDER — ONDANSETRON 4 MG/1
4 TABLET, ORALLY DISINTEGRATING ORAL EVERY 6 HOURS PRN
Status: DISCONTINUED | OUTPATIENT
Start: 2023-04-09 | End: 2023-04-11 | Stop reason: HOSPADM

## 2023-04-09 RX ORDER — SODIUM CHLORIDE 9 MG/ML
40 INJECTION, SOLUTION INTRAVENOUS AS NEEDED
Status: DISCONTINUED | OUTPATIENT
Start: 2023-04-09 | End: 2023-04-11 | Stop reason: HOSPADM

## 2023-04-09 RX ORDER — CETIRIZINE HYDROCHLORIDE 10 MG/1
10 TABLET ORAL DAILY
Status: DISCONTINUED | OUTPATIENT
Start: 2023-04-10 | End: 2023-04-11 | Stop reason: HOSPADM

## 2023-04-09 RX ORDER — FLUTICASONE PROPIONATE 50 MCG
2 SPRAY, SUSPENSION (ML) NASAL DAILY
Status: ON HOLD | COMMUNITY
Start: 2023-03-17 | End: 2023-04-09

## 2023-04-09 RX ORDER — PHENAZOPYRIDINE HYDROCHLORIDE 200 MG/1
200 TABLET, FILM COATED ORAL 3 TIMES DAILY
Status: CANCELLED | OUTPATIENT
Start: 2023-04-09

## 2023-04-09 RX ORDER — ACETAMINOPHEN 500 MG
1000 TABLET ORAL EVERY 4 HOURS PRN
Status: CANCELLED | OUTPATIENT
Start: 2023-04-09

## 2023-04-09 RX ORDER — POTASSIUM CHLORIDE 20 MEQ/1
40 TABLET, EXTENDED RELEASE ORAL EVERY 4 HOURS
Status: COMPLETED | OUTPATIENT
Start: 2023-04-09 | End: 2023-04-10

## 2023-04-09 RX ORDER — PHENTERMINE HYDROCHLORIDE 37.5 MG/1
37.5 TABLET ORAL
COMMUNITY
End: 2023-04-11 | Stop reason: HOSPADM

## 2023-04-09 RX ORDER — POTASSIUM CHLORIDE 7.45 MG/ML
10 INJECTION INTRAVENOUS
Status: DISCONTINUED | OUTPATIENT
Start: 2023-04-09 | End: 2023-04-11 | Stop reason: HOSPADM

## 2023-04-09 RX ORDER — MAGNESIUM SULFATE HEPTAHYDRATE 40 MG/ML
4 INJECTION, SOLUTION INTRAVENOUS ONCE
Status: COMPLETED | OUTPATIENT
Start: 2023-04-09 | End: 2023-04-10

## 2023-04-09 RX ADMIN — MORPHINE SULFATE 4 MG: 4 INJECTION, SOLUTION INTRAMUSCULAR; INTRAVENOUS at 13:31

## 2023-04-09 RX ADMIN — MAGNESIUM SULFATE HEPTAHYDRATE 4 G: 40 INJECTION, SOLUTION INTRAVENOUS at 21:28

## 2023-04-09 RX ADMIN — SODIUM CHLORIDE, POTASSIUM CHLORIDE, SODIUM LACTATE AND CALCIUM CHLORIDE 1500 ML: 600; 310; 30; 20 INJECTION, SOLUTION INTRAVENOUS at 22:14

## 2023-04-09 RX ADMIN — ONDANSETRON 4 MG: 2 INJECTION INTRAMUSCULAR; INTRAVENOUS at 08:31

## 2023-04-09 RX ADMIN — OXYCODONE 5 MG: 5 TABLET ORAL at 21:28

## 2023-04-09 RX ADMIN — CEFTRIAXONE 2 G: 2 INJECTION, POWDER, FOR SOLUTION INTRAMUSCULAR; INTRAVENOUS at 10:19

## 2023-04-09 RX ADMIN — Medication 3 ML: at 21:28

## 2023-04-09 RX ADMIN — KETOROLAC TROMETHAMINE 30 MG: 30 INJECTION, SOLUTION INTRAMUSCULAR; INTRAVENOUS at 19:31

## 2023-04-09 RX ADMIN — ANTACID TABLETS 2 TABLET: 500 TABLET, CHEWABLE ORAL at 14:16

## 2023-04-09 RX ADMIN — ONDANSETRON 4 MG: 2 INJECTION INTRAMUSCULAR; INTRAVENOUS at 14:19

## 2023-04-09 RX ADMIN — SODIUM CHLORIDE 2040 ML: 9 INJECTION, SOLUTION INTRAVENOUS at 10:26

## 2023-04-09 RX ADMIN — ACETAMINOPHEN 1000 MG: 500 TABLET ORAL at 19:31

## 2023-04-09 RX ADMIN — POTASSIUM CHLORIDE 40 MEQ: 20 TABLET, EXTENDED RELEASE ORAL at 21:28

## 2023-04-09 RX ADMIN — KETOROLAC TROMETHAMINE 15 MG: 30 INJECTION, SOLUTION INTRAMUSCULAR at 08:31

## 2023-04-09 NOTE — PROGRESS NOTES
Discharge Planning Assessment   [facility]     Patient Name: Addis Barcenas  MRN: [id number]  Today's Date: 4/9/2023    Admit Date: 4/9/2023    Plan: Pt lives at home with spouse who is at bedside and plans to return home at discharge family to provide transportation. Pcp is Enoc Narvaez at [facility], she uses [facility] and has [facility]. Pt is independent with adl's and does not use any dme, home health or home o2.   Discharge Needs Assessment     Row Name 04/09/23 1249       Living Environment    People in Home spouse    Current Living Arrangements home    Primary Care Provided by self    Family Caregiver if Needed spouse    Quality of Family Relationships helpful;involved;supportive    Able to Return to Prior Arrangements yes       Resource/Environmental Concerns    Resource/Environmental Concerns none       Transition Planning    Patient/Family Anticipates Transition to home with family    Patient/Family Anticipated Services at Transition none    Transportation Anticipated family or friend will provide       Discharge Needs Assessment    Readmission Within the Last 30 Days no previous admission in last 30 days    Equipment Currently Used at Home none    Concerns to be Addressed no discharge needs identified;denies needs/concerns at this time    Anticipated Changes Related to Illness none    Equipment Needed After Discharge none               Discharge Plan     Row Name 04/09/23 5802       Plan    Plan Pt lives at home with spouse who is at bedside and plans to return home at discharge family to provide transportation. Pcp is Enoc Narvaez at [facility], she uses [facility] and has [facility]. Pt is independent with adl's and does not use any dme, home health or home o2.    Patient/Family in Agreement with Plan yes              Continued Care and Services - Admitted Since 4/9/2023    Coordination has not been started for this encounter.       Expected Discharge Date and Time     Expected  Discharge Date Expected Discharge Time    Apr 11, 2023          Demographic Summary     Row Name 04/09/23 1249       General Information    Admission Type inpatient    Arrived From home    Referral Source emergency department    Reason for Consult discharge planning               Functional Status     Row Name 04/09/23 1249       Functional Status    Usual Activity Tolerance good    Current Activity Tolerance good               Psychosocial    No documentation.                Abuse/Neglect    No documentation.                Legal    No documentation.                Substance Abuse    No documentation.                Patient Forms    No documentation.                   Aparna Ordaz RN

## 2023-04-09 NOTE — PLAN OF CARE
Problem: Adjustment to Illness (Sepsis/Septic Shock)  Goal: Optimal Coping  Outcome: Ongoing, Progressing  Intervention: Optimize Psychosocial Adjustment to Illness  Recent Flowsheet Documentation  Taken 4/9/2023 1500 by Melisa Murray RN  Family/Support System Care: self-care encouraged     Problem: Nutrition Impaired (Sepsis/Septic Shock)  Goal: Optimal Nutrition Intake  Outcome: Ongoing, Progressing     Problem: Infection Progression (Sepsis/Septic Shock)  Goal: Absence of Infection Signs and Symptoms  Outcome: Ongoing, Progressing  Intervention: Initiate Sepsis Management  Recent Flowsheet Documentation  Taken 4/9/2023 1500 by Melisa Murray RN  Infection Prevention: rest/sleep promoted  Intervention: Promote Recovery  Recent Flowsheet Documentation  Taken 4/9/2023 1500 by Melisa Murray RN  Activity Management: ambulated to bathroom     Problem: Adult Inpatient Plan of Care  Goal: Absence of Hospital-Acquired Illness or Injury  Intervention: Identify and Manage Fall Risk  Recent Flowsheet Documentation  Taken 4/9/2023 1500 by Melisa Murray RN  Safety Promotion/Fall Prevention:   activity supervised   assistive device/personal items within reach   clutter free environment maintained   fall prevention program maintained   nonskid shoes/slippers when out of bed   room organization consistent   safety round/check completed     Problem: Adult Inpatient Plan of Care  Goal: Absence of Hospital-Acquired Illness or Injury  Intervention: Prevent Skin Injury  Recent Flowsheet Documentation  Taken 4/9/2023 1500 by Melisa Murray RN  Body Position:   sitting up in bed   position changed independently  Skin Protection:   adhesive use limited   incontinence pads utilized   tubing/devices free from skin contact     Problem: Adult Inpatient Plan of Care  Goal: Absence of Hospital-Acquired Illness or Injury  Intervention: Prevent and Manage VTE (Venous Thromboembolism) Risk  Recent Flowsheet  Documentation  Taken 4/9/2023 1500 by Melisa Murray, RN  Activity Management: ambulated to bathroom   Goal Outcome Evaluation:  Plan of Care Reviewed With: patient        Progress: no change  Outcome Evaluation: Pt welcomed to PCU at this time. Room air. Alert and oriented x4. NSR. Pt complains of right flank pain. Ambulated to bathroom upon arrival to unit. Significant other at bedside. Call light within reach. Will continue to follow plan of care throughout shift until 7p.

## 2023-04-09 NOTE — H&P
[facility]    Patient Identification:  Name:  Addis Barcenas  Age:  42 y.o.  Sex:  female  :  1980  MRN:  [id number]   Visit Number:  [id number]  Admit Date: 2023   Room number:  P211/1P  Primary Care Physician:  Romy Lieberman APRN    Date of Admission: 2023     Subjective     Chief complaint:    Chief Complaint   Patient presents with   • Flank Pain     History of presenting illness:  42 y.o. female who was admitted on 2023 from the ED with right back pain.  The patient only takes medication for allergies (Xyzal).  The patient states that she developed a burning pain in her right lower back 2 days prior to admission; she then had subjective fever, muscle aches, chills, sweating, and rigors.  She never had dysuria nor hematuria.  She stated that everything felt like it did years ago when she had a urinary tract infection.  The patient has never been diagnosed with any kidney stones.  The patient did go to an urgent care center 1 day prior to admission; she was given nitrofurantoin but despite this she worsened.  The patient started having such fatigue that she could barely move and then she came to our emergency department.  In the emergency department, the patient had a urinalysis suspicious for an acute urinary tract infection.  CT scan did not show any kidney stones nor any hydronephrosis.  Her blood pressures were low, even after she received a sepsis bolus.  As result, the patient is being admitted to the progressive care unit for further evaluation and treatment.  ---------------------------------------------------------------------------------------------------------------------   Review of Systems   Constitutional: Positive for chills, diaphoresis, fatigue and fever.   HENT: Positive for congestion. Negative for rhinorrhea.    Eyes: Negative for discharge and redness.   Respiratory: Positive for chest tightness and shortness of breath.  Negative for cough and wheezing.    Cardiovascular: Negative for chest pain and leg swelling.   Gastrointestinal: Positive for nausea and vomiting. Negative for abdominal pain and diarrhea.   Genitourinary: Negative for dysuria and hematuria.   Musculoskeletal: Positive for arthralgias, back pain (Right side burning) and myalgias.   Skin: Negative for rash and wound.   Neurological: Positive for headaches. Negative for facial asymmetry and light-headedness.   Psychiatric/Behavioral: Negative for agitation, behavioral problems and confusion.     ---------------------------------------------------------------------------------------------------------------------   Past Medical History:   Diagnosis Date   • Allergies      Past Surgical History:   Procedure Laterality Date   • EXPLORATORY LAPAROTOMY       Family History   Problem Relation Age of Onset   • Stroke Mother          in her 50's     Social History     Socioeconomic History   • Marital status: Legally    Tobacco Use   • Smoking status: Every Day     Packs/day: 0.50     Types: Cigarettes     Passive exposure: Past   • Smokeless tobacco: Never   Vaping Use   • Vaping Use: Every day   • Start date: 2021   • Devices: [employer], 6% NICOTINE   Substance and Sexual Activity   • Alcohol use: No   • Drug use: No   • Sexual activity: Defer     ---------------------------------------------------------------------------------------------------------------------   Allergies:  Patient has no known allergies.  ---------------------------------------------------------------------------------------------------------------------   Medications below are reported home medications pulling from within the system; at this time, these medications have not been reconciled unless otherwise specified and are in the verification process for further verification as current home medications.      Prior to Admission Medications     Prescriptions Last Dose Informant  Patient Reported? Taking?    cephalexin (KEFLEX) 500 MG capsule   No No    Take 1 capsule by mouth 2 (Two) Times a Day.    HYDROcodone-acetaminophen (NORCO) 5-325 MG per tablet   No No    Take 1 tablet by mouth Every 6 (Six) Hours As Needed for Mild Pain .    HYDROcodone-acetaminophen (NORCO) 5-325 MG per tablet   No No    Take 1 tablet by mouth Every 6 (Six) Hours As Needed for Mild Pain.    ibuprofen (ADVIL,MOTRIN) 800 MG tablet   No No    Take 1 tablet by mouth Every 6 (Six) Hours As Needed for Moderate Pain .    ondansetron ODT (ZOFRAN-ODT) 4 MG disintegrating tablet   No No    Take 1 tablet by mouth Every 6 (Six) Hours As Needed for Nausea or Vomiting.    polyethylene glycol (MIRALAX) packet   No No    Take 17 g by mouth Daily. Until desired results, then daily as needed        Objective     Vital Signs:  Temp:  [97.9 °F (36.6 °C)-100.5 °F (38.1 °C)] 100.5 °F (38.1 °C)  Heart Rate:  [56-96] 82  Resp:  [12-22] 21  BP: ()/(46-81) 103/58    Mean Arterial Pressure (Non-Invasive) for the past 24 hrs (Last 3 readings):   Noninvasive MAP (mmHg)   04/09/23 2000 66   04/09/23 1900 74   04/09/23 1800 74     SpO2:  [90 %-100 %] 99 %  on   ;   Device (Oxygen Therapy): room air  Body mass index is 25.94 kg/m².    Wt Readings from Last 3 Encounters:   04/09/23 72.9 kg (160 lb 11.5 oz)   02/08/23 68 kg (150 lb)   09/18/20 65.8 kg (145 lb)      ----------------------------------------------------------------------------------------------------------------------  Physical Exam  Vitals reviewed. Exam conducted with a chaperone present.   Constitutional:       General: She is awake. She is not in acute distress.     Appearance: She is well-developed. She is not ill-appearing, toxic-appearing or diaphoretic.      Comments: On room air.   HENT:      Head: Normocephalic and atraumatic.      Right Ear: External ear normal.      Left Ear: External ear normal.   Eyes:      General: No scleral icterus.        Right eye: No  discharge.         Left eye: No discharge.      Extraocular Movements: Extraocular movements intact.      Pupils: Pupils are equal, round, and reactive to light.   Cardiovascular:      Rate and Rhythm: Normal rate and regular rhythm.      Pulses: Normal pulses.      Heart sounds: No murmur heard.  Pulmonary:      Effort: Pulmonary effort is normal. No respiratory distress.      Breath sounds: No wheezing or rales.   Abdominal:      General: Bowel sounds are normal. There is no distension.      Palpations: Abdomen is soft.      Tenderness: There is right CVA tenderness. There is no left CVA tenderness.   Musculoskeletal:         General: No swelling, deformity or signs of injury.   Skin:     Capillary Refill: Capillary refill takes less than 2 seconds.      Coloration: Skin is not jaundiced or pale.   Neurological:      Mental Status: She is alert and oriented to person, place, and time. Mental status is at baseline.      Cranial Nerves: No cranial nerve deficit.   Psychiatric:         Mood and Affect: Mood normal.         Behavior: Behavior normal. Behavior is cooperative.       ---------------------------------------------------------------------------------------------------------------------  EKG:  EKG ordered.    Telemetry:  NS with heart rates 70-90's.  Please note that I personally looked at the telemetry strips.  --------------------------------------------------------------------------------------------------------------------  LABS:    CBC and coagulation:  Results from last 7 days   Lab Units 04/09/23  1007 04/09/23  0854   LACTATE mmol/L 0.8  --    WBC 10*3/mm3  --  15.34*   HEMOGLOBIN g/dL  --  12.4   HEMATOCRIT %  --  38.4   MCV fL  --  90.4   MCHC g/dL  --  32.3   PLATELETS 10*3/mm3  --  201     Renal and electrolytes:  Results from last 7 days   Lab Units 04/09/23  0854   SODIUM mmol/L 138   POTASSIUM mmol/L 3.2*   MAGNESIUM mg/dL 1.7   CHLORIDE mmol/L 104   CO2 mmol/L 21.6*   BUN mg/dL 11    CREATININE mg/dL 0.84   CALCIUM mg/dL 9.5   GLUCOSE mg/dL 118*     Estimated Creatinine Clearance: 88.2 mL/min (by C-G formula based on SCr of 0.84 mg/dL).    Liver and pancreatic function:  Results from last 7 days   Lab Units 04/09/23  0854   ALBUMIN g/dL 3.8   BILIRUBIN mg/dL 0.6   ALK PHOS U/L 69   AST (SGOT) U/L 22   ALT (SGPT) U/L 18     Endocrine function:  Lab Results   Component Value Date    HGBA1C 4.50 (L) 04/09/2023     Lab Results   Component Value Date    TSH 0.539 04/09/2023     Cultures:  Lab Results   Component Value Date    COLORU Orange (A) 04/09/2023    CLARITYU Turbid (A) 04/09/2023    PHUR 5.5 04/09/2023    GLUCOSEU Negative 04/09/2023    KETONESU >=160 mg/dL (4+) (A) 04/09/2023    BLOODU Moderate (2+) (A) 04/09/2023    NITRITEU Positive (A) 04/09/2023    LEUKOCYTESUR Small (1+) (A) 04/09/2023    BILIRUBINUR Small (1+) (A) 04/09/2023    UROBILINOGEN 1.0 E.U./dL 04/09/2023    RBCUA 13-20 (A) 04/09/2023    WBCUA 13-20 (A) 04/09/2023    BACTERIA 3+ (A) 04/09/2023     Microbiology Results (last 10 days)     Procedure Component Value - Date/Time    COVID-19 and FLU A/B PCR - Swab, Nasopharynx [[id number]]  (Normal) Collected: 04/09/23 1108    Lab Status: Final result Specimen: Swab from Nasopharynx Updated: 04/09/23 1138     COVID19 Not Detected     Influenza A PCR Not Detected     Influenza B PCR Not Detected    Narrative:      Fact sheet for providers: https://www.fda.gov/media/503506/download    Fact sheet for patients: https://www.fda.gov/media/310085/download    Test performed by PCR.              I have personally looked at the labs and they are summarized above.  ----------------------------------------------------------------------------------------------------------------------  Detailed radiology reports for the last 24 hours:    Imaging Results (Last 24 Hours)     Procedure Component Value Units Date/Time    XR Chest 1 View [[id number]] Collected: 04/09/23 1250     Updated: 04/09/23 1254     Narrative:        INDICATION:   Fever    TECHNIQUE: 1 views of the chest    COMPARISON:   Chest radiograph 1/13/2021    FINDINGS:    Lungs/Pleura: Increased lung markings. No focal opacity. No effusions.    Mediastinum: Normal mediastinal contours.  Heart is normal in size.    Other: None      Impression:      Increased lung markings, suspect related to underlying airways disease such as reactive airways disease or bronchitis/bronchiolitis    Signer Name: Cliff Ahn MD   Signed: 4/9/2023 12:50 PM   Workstation Name: [id number]    [facility]    CT Abdomen Pelvis Stone Protocol [[id number]] Collected: 04/09/23 1206     Updated: 04/09/23 1208    Narrative:          INDICATION:   Flank pain    TECHNIQUE:   Routine CT imaging of the kidneys, ureters, and bladder without IV contrast. Coronal and sagittal reformats. Radiation dose reduction techniques included automated exposure control or exposure modulation based on body size. Count of known CT and cardiac  nuc med studies performed in previous 12 months: 0.     COMPARISON:   None available.    FINDINGS:    LOWER THORAX: Subsegmental atelectasis or scarring in the inferior right middle lobe.    RIGHT KIDNEY AND URETER: Small amount of perinephric edema. No urolithiasis. No hydronephrosis.    LEFT KIDNEY AND URETER: No stones, hydronephrosis, or hydroureter.    OTHER SOLID ORGANS: Unremarkable noncontrast appearance of the liver, pancreas, spleen, and adrenal glands.    GALLBLADDER AND BILE DUCTS: Distended gallbladder without wall thickening or surrounding inflammation.    PELVIC ORGANS: Underdistended bladder. No bladder calculus. Anteverted uterus. No adnexal mass. Small amount of free fluid in the cul-de-sac, within physiologic limits.     PERITONEAL CAVITY/GASTROINTESTINAL TRACT:  Moderate gas and stool seen in the colon. No obstruction. Normal appendix.    BODY WALL: Small fat-containing umbilical hernia.     RETROPERITONEUM:  Unremarkable.     VASCULAR: Mild aortoiliac atherosclerotic calcification.    BONES: No significant abnormality.      Impression:        1. No urolithiasis or hydronephrosis.  2. Mild asymmetric right perinephric edema. Nonspecific though could indicate pyelonephritis in the appropriate clinical and laboratory setting. Recommend correlation with urinalysis.    Signer Name: Cliff Ahn MD   Signed: 4/9/2023 12:06 PM   Workstation Name: [id number]    [facility]        I have personally looked at the radiology images and I have read the available final reports.    Assessment & Plan        -Severe sepsis that was present on admission due to right sided pyelopnephritis  -History of bipolar and post partum depression  -Acute hypokalemia and acute hypomagnesemia    Because her blood pressures are borderline low, I have admitted the patient to the progressive care unit just in case she needs vasopressors later.  I will continue giving her IV fluids.  We will give her empiric Rocephin at 2 g daily while we await the urine culture.  We will repeat blood work morning  We will avoid QT prolonging agents and continue to monitor the electrolytes closely. In order to lessen the risk of worsening QTc, the goal potassium level is 4-4.5 and goal magnesium level is 2-2.2.  Please note that I have written for magnesium and potassium replacement.    VTE Prophylaxis:   Mechanical Order History:      Ordered        Signed and Held  Place Sequential Compression Device  Once            Signed and Held  Maintain Sequential Compression Device  Continuous                    Pharmalogical Order History:     None        The patient is high risk due to the following diagnoses/reasons:  Severe sepsis    Anam Ochoa MD  [facility]  04/09/23  20:49 EDT

## 2023-04-09 NOTE — ED NOTES
Patient resting on ED stretcher alert and verbal. Patient continues to complain of pain in right flank, patient educated on hypotensive state and that pain medication will also decrease her blood pressure. ED provider made aware and at bedside. All safety measures in place. Call light in reach. Patient also noted to have arm bent while fluids are infusing, educated to keep arm straight d/t placement of  IV and importance of fluid infusion. Verbalizes understanding.

## 2023-04-09 NOTE — ED PROVIDER NOTES
Subjective   History of Present Illness  42-year-old female presents secondary to right flank pain.  Patient states this started Friday.  She was seen by primary care and started on antibiotics for UTI yesterday.  She states that she has continued to have pain.  She hurts with movement.  She states that she has been lifting and pulling a lot over the last week.  She denies any numbness tingling or pain in her legs.  She has had slight burning with urination.  She has had subjective fever along with chills.  She has had some nausea however no vomiting.  She voices no other complaints this time.  She has no past medical history.  She arrived by ambulance.        Review of Systems   Constitutional: Negative.  Negative for fever.   HENT: Negative.    Respiratory: Negative.    Cardiovascular: Negative.  Negative for chest pain.   Gastrointestinal: Negative.  Negative for abdominal pain.   Endocrine: Negative.    Genitourinary: Positive for dysuria, flank pain and frequency.   Skin: Negative.    Neurological: Negative.    Psychiatric/Behavioral: Negative.    All other systems reviewed and are negative.      No past medical history on file.    No Known Allergies    Past Surgical History:   Procedure Laterality Date   • EXPLORATORY LAPAROTOMY         No family history on file.    Social History     Socioeconomic History   • Marital status: Legally    Tobacco Use   • Smoking status: Every Day     Packs/day: 0.50     Types: Cigarettes   • Smokeless tobacco: Never   Substance and Sexual Activity   • Alcohol use: No   • Drug use: No   • Sexual activity: Defer           Objective   Physical Exam  Vitals and nursing note reviewed.   Constitutional:       General: She is not in acute distress.     Appearance: She is well-developed. She is not diaphoretic.   HENT:      Head: Normocephalic and atraumatic.      Right Ear: External ear normal.      Left Ear: External ear normal.      Nose: Nose normal.   Eyes:       Conjunctiva/sclera: Conjunctivae normal.      Pupils: Pupils are equal, round, and reactive to light.   Neck:      Vascular: No JVD.      Trachea: No tracheal deviation.   Cardiovascular:      Rate and Rhythm: Normal rate and regular rhythm.      Heart sounds: Normal heart sounds. No murmur heard.  Pulmonary:      Effort: Pulmonary effort is normal. No respiratory distress.      Breath sounds: Normal breath sounds. No wheezing.   Abdominal:      General: Bowel sounds are normal.      Palpations: Abdomen is soft.      Tenderness: There is no abdominal tenderness.   Musculoskeletal:         General: No deformity. Normal range of motion.      Cervical back: Normal range of motion and neck supple.   Skin:     General: Skin is warm and dry.      Coloration: Skin is not pale.      Findings: No erythema or rash.   Neurological:      Mental Status: She is alert and oriented to person, place, and time.      Cranial Nerves: No cranial nerve deficit.   Psychiatric:         Behavior: Behavior normal.         Thought Content: Thought content normal.         Procedures           ED Course  ED Course as of 04/09/23 1310   Sun Apr 09, 2023   1024 Patient's blood pressure dropped to 84/54.  This was rechecked multiple times.  This was not in relationship to any pain medication.  Blood culture and lactic acid had previously been obtained due to patient's white blood cell count.  Sepsis bolus will be started. [JI]   1208 Still awaiting CT. [JI]   1219 Reached out to the hospitalist service.  Blood pressure currently 110/78.  Heart rate is 78 [JI]   1240 Accepted by Dr Silveira. [JI]      ED Course User Index  [JI] Gerardo Giron PA                                           Medical Decision Making  42-year-old female presents secondary to right flank pain.  Patient states this started Friday.  She was seen by primary care and started on antibiotics for UTI yesterday.  She states that she has continued to have pain.  She hurts with  movement.  She states that she has been lifting and pulling a lot over the last week.  She denies any numbness tingling or pain in her legs.  She has had slight burning with urination.  She has had subjective fever along with chills.  She has had some nausea however no vomiting.  She voices no other complaints this time.  She has no past medical history.  She arrived by ambulance.    Acute pyelonephritis: acute illness or injury  Hypotension, unspecified hypotension type: acute illness or injury  Sepsis, due to unspecified organism, unspecified whether acute organ dysfunction present: acute illness or injury  Amount and/or Complexity of Data Reviewed  Labs: ordered. Decision-making details documented in ED Course.  Radiology: ordered. Decision-making details documented in ED Course.  Discussion of management or test interpretation with external provider(s): Dr Jordana Solorzano  Prescription drug management.  Decision regarding hospitalization.    Risk Details: Patient was agreeable to treatment plan of admission to the hospital        Final diagnoses:   Sepsis, due to unspecified organism, unspecified whether acute organ dysfunction present   Hypotension, unspecified hypotension type   Acute pyelonephritis       ED Disposition  ED Disposition     ED Disposition   Decision to Admit    Condition   --    Comment   Level of Care: Progressive Care [20]   Diagnosis: Severe sepsis [[id number]]   Certification: I Certify That Inpatient Hospital Services Are Medically Necessary For Greater Than 2 Midnights               No follow-up provider specified.       Medication List      No changes were made to your prescriptions during this visit.          Gerardo Giron PA  04/09/23 5544

## 2023-04-09 NOTE — ED NOTES
"Patient advises she was diagnosed with a UTI yesterday at urgent care, she was prescribed bactrim and pyridium. States the pain has progressively worsened becoming unbearable this morning,. Patient had a UTI approx 3 months ago as well, states she does not think she is not completely emptying her bladder when she urinates, \"my mother was diagnosed with something when she was 3-4 years older than me where she has to take an antibiotic for UTIs all the time and has to self cath.\" Patient complains of severe right flank pain that does improve in certain positions. CVA tenderness noted to the right. Respirations are even and unlabored. Abdomen has generalized tenderness noted that is worse in RLQ. BS + x4 quads via auscultation. GCS 15. All safety measures in place. Call light in reach.  "

## 2023-04-09 NOTE — NURSING NOTE
Upon arrival to PCU, patient belongings include: Black sweatpants, blue shirt, socks, grey WC jacket, black tennis shoes, necklace, ring, cell phone, cell phone  with wall adapter, black/white blanket with patient at bedside.

## 2023-04-10 LAB
ALBUMIN SERPL-MCNC: 2.7 G/DL (ref 3.5–5.2)
ALBUMIN/GLOB SERPL: 1.1 G/DL
ALP SERPL-CCNC: 62 U/L (ref 39–117)
ALT SERPL W P-5'-P-CCNC: 15 U/L (ref 1–33)
ANION GAP SERPL CALCULATED.3IONS-SCNC: 7 MMOL/L (ref 5–15)
AST SERPL-CCNC: 19 U/L (ref 1–32)
BACTERIA BLD CULT: ABNORMAL
BACTERIA SPEC AEROBE CULT: NORMAL
BASOPHILS # BLD AUTO: 0.03 10*3/MM3 (ref 0–0.2)
BASOPHILS NFR BLD AUTO: 0.2 % (ref 0–1.5)
BILIRUB SERPL-MCNC: 0.3 MG/DL (ref 0–1.2)
BOTTLE TYPE: ABNORMAL
BUN SERPL-MCNC: 8 MG/DL (ref 6–20)
BUN/CREAT SERPL: 10.8 (ref 7–25)
CALCIUM SPEC-SCNC: 8.5 MG/DL (ref 8.6–10.5)
CHLORIDE SERPL-SCNC: 108 MMOL/L (ref 98–107)
CO2 SERPL-SCNC: 23 MMOL/L (ref 22–29)
CREAT SERPL-MCNC: 0.74 MG/DL (ref 0.57–1)
DEPRECATED RDW RBC AUTO: 48.3 FL (ref 37–54)
EGFRCR SERPLBLD CKD-EPI 2021: 103.1 ML/MIN/1.73
EOSINOPHIL # BLD AUTO: 0.76 10*3/MM3 (ref 0–0.4)
EOSINOPHIL NFR BLD AUTO: 6.1 % (ref 0.3–6.2)
ERYTHROCYTE [DISTWIDTH] IN BLOOD BY AUTOMATED COUNT: 14.2 % (ref 12.3–15.4)
GLOBULIN UR ELPH-MCNC: 2.5 GM/DL
GLUCOSE SERPL-MCNC: 121 MG/DL (ref 65–99)
HCT VFR BLD AUTO: 32.5 % (ref 34–46.6)
HGB BLD-MCNC: 10.2 G/DL (ref 12–15.9)
IMM GRANULOCYTES # BLD AUTO: 0.05 10*3/MM3 (ref 0–0.05)
IMM GRANULOCYTES NFR BLD AUTO: 0.4 % (ref 0–0.5)
LYMPHOCYTES # BLD AUTO: 1.04 10*3/MM3 (ref 0.7–3.1)
LYMPHOCYTES NFR BLD AUTO: 8.4 % (ref 19.6–45.3)
MAGNESIUM SERPL-MCNC: 3 MG/DL (ref 1.6–2.6)
MCH RBC QN AUTO: 29.3 PG (ref 26.6–33)
MCHC RBC AUTO-ENTMCNC: 31.4 G/DL (ref 31.5–35.7)
MCV RBC AUTO: 93.4 FL (ref 79–97)
MONOCYTES # BLD AUTO: 1.22 10*3/MM3 (ref 0.1–0.9)
MONOCYTES NFR BLD AUTO: 9.8 % (ref 5–12)
NEUTROPHILS NFR BLD AUTO: 75.1 % (ref 42.7–76)
NEUTROPHILS NFR BLD AUTO: 9.3 10*3/MM3 (ref 1.7–7)
NRBC BLD AUTO-RTO: 0 /100 WBC (ref 0–0.2)
PHOSPHATE SERPL-MCNC: 2.7 MG/DL (ref 2.5–4.5)
PLATELET # BLD AUTO: 174 10*3/MM3 (ref 140–450)
PMV BLD AUTO: 9.9 FL (ref 6–12)
POTASSIUM SERPL-SCNC: 3.9 MMOL/L (ref 3.5–5.2)
POTASSIUM SERPL-SCNC: 4.3 MMOL/L (ref 3.5–5.2)
PROT SERPL-MCNC: 5.2 G/DL (ref 6–8.5)
RBC # BLD AUTO: 3.48 10*6/MM3 (ref 3.77–5.28)
SODIUM SERPL-SCNC: 138 MMOL/L (ref 136–145)
WBC NRBC COR # BLD: 12.4 10*3/MM3 (ref 3.4–10.8)

## 2023-04-10 PROCEDURE — 83735 ASSAY OF MAGNESIUM: CPT | Performed by: INTERNAL MEDICINE

## 2023-04-10 PROCEDURE — 25010000002 CEFTRIAXONE PER 250 MG: Performed by: INTERNAL MEDICINE

## 2023-04-10 PROCEDURE — 84132 ASSAY OF SERUM POTASSIUM: CPT | Performed by: STUDENT IN AN ORGANIZED HEALTH CARE EDUCATION/TRAINING PROGRAM

## 2023-04-10 PROCEDURE — 84100 ASSAY OF PHOSPHORUS: CPT | Performed by: INTERNAL MEDICINE

## 2023-04-10 PROCEDURE — 25010000002 KETOROLAC TROMETHAMINE PER 15 MG: Performed by: STUDENT IN AN ORGANIZED HEALTH CARE EDUCATION/TRAINING PROGRAM

## 2023-04-10 PROCEDURE — 85025 COMPLETE CBC W/AUTO DIFF WBC: CPT | Performed by: INTERNAL MEDICINE

## 2023-04-10 PROCEDURE — 25010000002 ENOXAPARIN PER 10 MG: Performed by: INTERNAL MEDICINE

## 2023-04-10 PROCEDURE — 80053 COMPREHEN METABOLIC PANEL: CPT | Performed by: INTERNAL MEDICINE

## 2023-04-10 PROCEDURE — 99233 SBSQ HOSP IP/OBS HIGH 50: CPT | Performed by: INTERNAL MEDICINE

## 2023-04-10 RX ORDER — ENOXAPARIN SODIUM 100 MG/ML
40 INJECTION SUBCUTANEOUS NIGHTLY
Status: DISCONTINUED | OUTPATIENT
Start: 2023-04-10 | End: 2023-04-11 | Stop reason: HOSPADM

## 2023-04-10 RX ORDER — SODIUM CHLORIDE, SODIUM LACTATE, POTASSIUM CHLORIDE, CALCIUM CHLORIDE 600; 310; 30; 20 MG/100ML; MG/100ML; MG/100ML; MG/100ML
100 INJECTION, SOLUTION INTRAVENOUS CONTINUOUS
Status: DISCONTINUED | OUTPATIENT
Start: 2023-04-10 | End: 2023-04-11 | Stop reason: HOSPADM

## 2023-04-10 RX ORDER — FLUCONAZOLE 150 MG/1
150 TABLET ORAL ONCE
Status: COMPLETED | OUTPATIENT
Start: 2023-04-10 | End: 2023-04-10

## 2023-04-10 RX ADMIN — KETOROLAC TROMETHAMINE 30 MG: 30 INJECTION, SOLUTION INTRAMUSCULAR; INTRAVENOUS at 05:56

## 2023-04-10 RX ADMIN — ACETAMINOPHEN 1000 MG: 500 TABLET ORAL at 20:24

## 2023-04-10 RX ADMIN — KETOROLAC TROMETHAMINE 30 MG: 30 INJECTION, SOLUTION INTRAMUSCULAR; INTRAVENOUS at 22:46

## 2023-04-10 RX ADMIN — Medication 3 ML: at 09:15

## 2023-04-10 RX ADMIN — ACETAMINOPHEN 1000 MG: 500 TABLET ORAL at 09:54

## 2023-04-10 RX ADMIN — SODIUM CHLORIDE, POTASSIUM CHLORIDE, SODIUM LACTATE AND CALCIUM CHLORIDE 100 ML/HR: 600; 310; 30; 20 INJECTION, SOLUTION INTRAVENOUS at 01:09

## 2023-04-10 RX ADMIN — ACETAMINOPHEN 1000 MG: 500 TABLET ORAL at 15:00

## 2023-04-10 RX ADMIN — CETIRIZINE HYDROCHLORIDE 10 MG: 10 TABLET, FILM COATED ORAL at 09:54

## 2023-04-10 RX ADMIN — KETOROLAC TROMETHAMINE 30 MG: 30 INJECTION, SOLUTION INTRAMUSCULAR; INTRAVENOUS at 16:39

## 2023-04-10 RX ADMIN — Medication 3 ML: at 20:25

## 2023-04-10 RX ADMIN — SODIUM CHLORIDE, POTASSIUM CHLORIDE, SODIUM LACTATE AND CALCIUM CHLORIDE 100 ML/HR: 600; 310; 30; 20 INJECTION, SOLUTION INTRAVENOUS at 20:28

## 2023-04-10 RX ADMIN — SODIUM CHLORIDE, POTASSIUM CHLORIDE, SODIUM LACTATE AND CALCIUM CHLORIDE 100 ML/HR: 600; 310; 30; 20 INJECTION, SOLUTION INTRAVENOUS at 10:53

## 2023-04-10 RX ADMIN — FLUCONAZOLE 150 MG: 150 TABLET ORAL at 16:39

## 2023-04-10 RX ADMIN — OXYCODONE 5 MG: 5 TABLET ORAL at 09:15

## 2023-04-10 RX ADMIN — CEFTRIAXONE 2 G: 2 INJECTION, POWDER, FOR SOLUTION INTRAMUSCULAR; INTRAVENOUS at 09:15

## 2023-04-10 RX ADMIN — ENOXAPARIN SODIUM 40 MG: 40 INJECTION SUBCUTANEOUS at 20:25

## 2023-04-10 RX ADMIN — POTASSIUM CHLORIDE 40 MEQ: 20 TABLET, EXTENDED RELEASE ORAL at 01:09

## 2023-04-10 NOTE — PROGRESS NOTES
[facility] HOSPITALIST PROGRESS NOTE    Subjective     History:   Addis Barcenas is a 42 y.o. female admitted on 4/9/2023 secondary to Severe sepsis     Procedures: None    CC: Follow up pyelonephritis     Patient seen and examined with ELISSA Vincent. Awake and alert. Reports some dysuria, right flank pain and nausea but feels overall improved. No reported CP or dyspnea. No further episodes of vomiting reported. BP borderline low. No acute events overnight per RN.     History taken from: patient, chart, and RN.      Objective     Vital Signs  Temp:  [97.7 °F (36.5 °C)-100.5 °F (38.1 °C)] 97.8 °F (36.6 °C)  Heart Rate:  [43-96] 78  Resp:  [9-22] 9  BP: ()/(31-77) 107/77    Intake/Output Summary (Last 24 hours) at 4/10/2023 1527  Last data filed at 4/10/2023 0600  Gross per 24 hour   Intake 945 ml   Output 600 ml   Net 345 ml         Physical Exam:  General:    Awake, alert, in no acute distress   Heart:      Normal S1 and S2. Regular rate and rhythm. No significant murmur, rubs or gallops appreciated.   Lungs:     Respirations regular, even and unlabored. Lungs clear to auscultation B/L. No wheezes, rales or rhonchi.   Abdomen:   Soft. No guarding, rebound tenderness or  organomegaly noted. Bowel sounds present x 4. (+) right CVA tenderness.    Extremities:  No clubbing, cyanosis or edema noted. Moves UE and LE equally B/L.     Results Review:    Results from last 7 days   Lab Units 04/10/23  0031 04/09/23  0854   WBC 10*3/mm3 12.40* 15.34*   HEMOGLOBIN g/dL 10.2* 12.4   PLATELETS 10*3/mm3 174 201     Results from last 7 days   Lab Units 04/10/23  0527 04/10/23  0031 04/09/23  0854   SODIUM mmol/L  --  138 138   POTASSIUM mmol/L 4.3 3.9 3.2*   CHLORIDE mmol/L  --  108* 104   CO2 mmol/L  --  23.0 21.6*   BUN mg/dL  --  8 11   CREATININE mg/dL  --  0.74 0.84   CALCIUM mg/dL  --  8.5* 9.5   GLUCOSE mg/dL  --  121* 118*     Results from last 7 days   Lab Units 04/10/23  0031 04/09/23  0854   BILIRUBIN  mg/dL 0.3 0.6   ALK PHOS U/L 62 69   AST (SGOT) U/L 19 22   ALT (SGPT) U/L 15 18     Results from last 7 days   Lab Units 04/10/23  0031 04/09/23  0854   MAGNESIUM mg/dL 3.0* 1.7               Imaging Results (Last 24 Hours)     ** No results found for the last 24 hours. **            Medications:  acetaminophen, 1,000 mg, Oral, TID  cefTRIAXone, 2 g, Intravenous, Q24H  cetirizine, 10 mg, Oral, Daily  fluconazole, 150 mg, Oral, Once  sodium chloride, 3 mL, Intravenous, Q12H      lactated ringers, 100 mL/hr, Last Rate: 100 mL/hr (04/10/23 1053)  norepinephrine, 0.02-0.3 mcg/kg/min, Last Rate: Stopped (04/10/23 0130)            Assessment & Plan   Severe sepsis: Likely 2/2 UTI/right-sided pyelonephritis and bacteremia. Afebrile. BP borderline low. WBC improved today. Lactate is normal. Cont high dose Rocephin and maintenance IVF's. Follow cultures and repeat labs in the AM.     UTI/right-sided pyelonephritis: CT abd/pelvis reveals right perinephric edema. Urine culture revealing mixed nina. Cont high dose Rocephin.     E coli bacteremia: Likely urinary source. 1/2 cultures revealing E coli per BCID. Cont high dose Rocephin pending final culture results.     Hypokalemia: K+ improved with supplementation. Mg initially<2 but improved with supplementation as well. Cont electrolyte replacement protocols.     DVT PPX: Add SQ Lovenox    Pt is at high risk 2/2 severe sepsis, UTI/pyelonephritis and bacteremia.     Disposition: Likely home when medically stable, 1-2 days.     Bryce Elaine DO  04/10/23  15:27 EDT

## 2023-04-10 NOTE — PAYOR COMM NOTE
"CONTACT:  MATTHEW DUARTE MSN, APRN  UTILIZATION MANAGEMENT DEPT.  [facility]  [address] [facility] [place] [address]  PHONE:  [phone]  FAX: [phone]    INPATIENT AUTHORIZATION REQUEST    Addis Barcenas (42 y.o. Female)     Date of Birth   1980    Social Security Number       Address   [address] [place] [address]    Home Phone   [phone]    MRN   [id number]       Taoism   None    Marital Status   Legally                             Admission Date   23    Admission Type   Emergency    Admitting Provider   Anam Ochoa MD    Attending Provider   Bryce Elaine DO    Department, Room/Bed   [facility] PROGRESS CARE, P211/       Discharge Date       Discharge Disposition       Discharge Destination                               Attending Provider: Bryce Elaine DO    Allergies: No Known Allergies    Isolation: None   Infection: None   Code Status: CPR    Ht: 167.6 cm (66\")   Wt: 72.4 kg (159 lb 11.2 oz)    Admission Cmt: None   Principal Problem: Severe sepsis [A41.9,R65.20]                 Active Insurance as of 2023     Primary Coverage     Payor Plan Insurance Group Employer/Plan Group    [employer] [place] [employer]ICA[employer]      Payor Plan Address Payor Plan Phone Number Payor Plan Fax Number Effective Dates    [address] [address] [phone]  2016 - None Entered    [facility] [id number]       Subscriber Name Subscriber Birth Date Member ID       ADDIS BARCENAS 1980 [id number]                 Emergency Contacts      (Rel.) Home Phone Work Phone Mobile Phone    Cliff White (Friend) [phone] -- --               History & Physical      Anam Ochoa MD at 23 1243              [facility] HOSPITALIST HISTORY AND PHYSICAL    Patient Identification:  Name:  Addis Barcenas  Age:  42 y.o.  Sex:  female  :  1980  MRN:  [id number]   Visit Number:  [id number]  Admit Date: 2023   Room " ----- Message from Mishel Jalloh sent at 1/27/2017  3:50 PM CST -----  Contact: spouse  Pt spouse is requesting something for nausea. Pt has been throwing up all day. Please call  Krystal @[phone]        Thanks   number:  [id number]  Primary Care Physician:  Romy Lieberman, APRN    Date of Admission: 4/9/2023     Subjective     Chief complaint:    Chief Complaint   Patient presents with   • Flank Pain     History of presenting illness:  42 y.o. female who was admitted on 4/9/2023 from the ED with right back pain.  The patient only takes medication for allergies (Xyzal).  The patient states that she developed a burning pain in her right lower back 2 days prior to admission; she then had subjective fever, muscle aches, chills, sweating, and rigors.  She never had dysuria nor hematuria.  She stated that everything felt like it did years ago when she had a urinary tract infection.  The patient has never been diagnosed with any kidney stones.  The patient did go to an urgent care center 1 day prior to admission; she was given nitrofurantoin but despite this she worsened.  The patient started having such fatigue that she could barely move and then she came to our emergency department.  In the emergency department, the patient had a urinalysis suspicious for an acute urinary tract infection.  CT scan did not show any kidney stones nor any hydronephrosis.  Her blood pressures were low, even after she received a sepsis bolus.  As result, the patient is being admitted to the progressive care unit for further evaluation and treatment.  ---------------------------------------------------------------------------------------------------------------------   Review of Systems   Constitutional: Positive for chills, diaphoresis, fatigue and fever.   HENT: Positive for congestion. Negative for rhinorrhea.    Eyes: Negative for discharge and redness.   Respiratory: Positive for chest tightness and shortness of breath. Negative for cough and wheezing.    Cardiovascular: Negative for chest pain and leg swelling.   Gastrointestinal: Positive for nausea and vomiting. Negative for abdominal pain and diarrhea.   Genitourinary: Negative for dysuria and  hematuria.   Musculoskeletal: Positive for arthralgias, back pain (Right side burning) and myalgias.   Skin: Negative for rash and wound.   Neurological: Positive for headaches. Negative for facial asymmetry and light-headedness.   Psychiatric/Behavioral: Negative for agitation, behavioral problems and confusion.     ---------------------------------------------------------------------------------------------------------------------   Past Medical History:   Diagnosis Date   • Allergies      Past Surgical History:   Procedure Laterality Date   • EXPLORATORY LAPAROTOMY       Family History   Problem Relation Age of Onset   • Stroke Mother          in her 50's     Social History     Socioeconomic History   • Marital status: Legally    Tobacco Use   • Smoking status: Every Day     Packs/day: 0.50     Types: Cigarettes     Passive exposure: Past   • Smokeless tobacco: Never   Vaping Use   • Vaping Use: Every day   • Start date: 2021   • Devices: Proximex, 6% NICOTINE   Substance and Sexual Activity   • Alcohol use: No   • Drug use: No   • Sexual activity: Defer     ---------------------------------------------------------------------------------------------------------------------   Allergies:  Patient has no known allergies.  ---------------------------------------------------------------------------------------------------------------------   Medications below are reported home medications pulling from within the system; at this time, these medications have not been reconciled unless otherwise specified and are in the verification process for further verification as current home medications.      Prior to Admission Medications     Prescriptions Last Dose Informant Patient Reported? Taking?    cephalexin (KEFLEX) 500 MG capsule   No No    Take 1 capsule by mouth 2 (Two) Times a Day.    HYDROcodone-acetaminophen (NORCO) 5-325 MG per tablet   No No    Take 1 tablet by mouth Every 6 (Six) Hours As  Needed for Mild Pain .    HYDROcodone-acetaminophen (NORCO) 5-325 MG per tablet   No No    Take 1 tablet by mouth Every 6 (Six) Hours As Needed for Mild Pain.    ibuprofen (ADVIL,MOTRIN) 800 MG tablet   No No    Take 1 tablet by mouth Every 6 (Six) Hours As Needed for Moderate Pain .    ondansetron ODT (ZOFRAN-ODT) 4 MG disintegrating tablet   No No    Take 1 tablet by mouth Every 6 (Six) Hours As Needed for Nausea or Vomiting.    polyethylene glycol (MIRALAX) packet   No No    Take 17 g by mouth Daily. Until desired results, then daily as needed        Objective     Vital Signs:  Temp:  [97.9 °F (36.6 °C)-100.5 °F (38.1 °C)] 100.5 °F (38.1 °C)  Heart Rate:  [56-96] 82  Resp:  [12-22] 21  BP: ()/(46-81) 103/58    Mean Arterial Pressure (Non-Invasive) for the past 24 hrs (Last 3 readings):   Noninvasive MAP (mmHg)   04/09/23 2000 66   04/09/23 1900 74   04/09/23 1800 74     SpO2:  [90 %-100 %] 99 %  on   ;   Device (Oxygen Therapy): room air  Body mass index is 25.94 kg/m².    Wt Readings from Last 3 Encounters:   04/09/23 72.9 kg (160 lb 11.5 oz)   02/08/23 68 kg (150 lb)   09/18/20 65.8 kg (145 lb)      ----------------------------------------------------------------------------------------------------------------------  Physical Exam  Vitals reviewed. Exam conducted with a chaperone present.   Constitutional:       General: She is awake. She is not in acute distress.     Appearance: She is well-developed. She is not ill-appearing, toxic-appearing or diaphoretic.      Comments: On room air.   HENT:      Head: Normocephalic and atraumatic.      Right Ear: External ear normal.      Left Ear: External ear normal.   Eyes:      General: No scleral icterus.        Right eye: No discharge.         Left eye: No discharge.      Extraocular Movements: Extraocular movements intact.      Pupils: Pupils are equal, round, and reactive to light.   Cardiovascular:      Rate and Rhythm: Normal rate and regular rhythm.       Pulses: Normal pulses.      Heart sounds: No murmur heard.  Pulmonary:      Effort: Pulmonary effort is normal. No respiratory distress.      Breath sounds: No wheezing or rales.   Abdominal:      General: Bowel sounds are normal. There is no distension.      Palpations: Abdomen is soft.      Tenderness: There is right CVA tenderness. There is no left CVA tenderness.   Musculoskeletal:         General: No swelling, deformity or signs of injury.   Skin:     Capillary Refill: Capillary refill takes less than 2 seconds.      Coloration: Skin is not jaundiced or pale.   Neurological:      Mental Status: She is alert and oriented to person, place, and time. Mental status is at baseline.      Cranial Nerves: No cranial nerve deficit.   Psychiatric:         Mood and Affect: Mood normal.         Behavior: Behavior normal. Behavior is cooperative.       ---------------------------------------------------------------------------------------------------------------------  EKG:  EKG ordered.    Telemetry:  NS with heart rates 70-90's.  Please note that I personally looked at the telemetry strips.  --------------------------------------------------------------------------------------------------------------------  LABS:    CBC and coagulation:  Results from last 7 days   Lab Units 04/09/23  1007 04/09/23  0854   LACTATE mmol/L 0.8  --    WBC 10*3/mm3  --  15.34*   HEMOGLOBIN g/dL  --  12.4   HEMATOCRIT %  --  38.4   MCV fL  --  90.4   MCHC g/dL  --  32.3   PLATELETS 10*3/mm3  --  201     Renal and electrolytes:  Results from last 7 days   Lab Units 04/09/23  0854   SODIUM mmol/L 138   POTASSIUM mmol/L 3.2*   MAGNESIUM mg/dL 1.7   CHLORIDE mmol/L 104   CO2 mmol/L 21.6*   BUN mg/dL 11   CREATININE mg/dL 0.84   CALCIUM mg/dL 9.5   GLUCOSE mg/dL 118*     Estimated Creatinine Clearance: 88.2 mL/min (by C-G formula based on SCr of 0.84 mg/dL).    Liver and pancreatic function:  Results from last 7 days   Lab Units 04/09/23  0854    ALBUMIN g/dL 3.8   BILIRUBIN mg/dL 0.6   ALK PHOS U/L 69   AST (SGOT) U/L 22   ALT (SGPT) U/L 18     Endocrine function:  Lab Results   Component Value Date    HGBA1C 4.50 (L) 04/09/2023     Lab Results   Component Value Date    TSH 0.539 04/09/2023     Cultures:  Lab Results   Component Value Date    COLORU Orange (A) 04/09/2023    CLARITYU Turbid (A) 04/09/2023    PHUR 5.5 04/09/2023    GLUCOSEU Negative 04/09/2023    KETONESU >=160 mg/dL (4+) (A) 04/09/2023    BLOODU Moderate (2+) (A) 04/09/2023    NITRITEU Positive (A) 04/09/2023    LEUKOCYTESUR Small (1+) (A) 04/09/2023    BILIRUBINUR Small (1+) (A) 04/09/2023    UROBILINOGEN 1.0 E.U./dL 04/09/2023    RBCUA 13-20 (A) 04/09/2023    WBCUA 13-20 (A) 04/09/2023    BACTERIA 3+ (A) 04/09/2023     Microbiology Results (last 10 days)     Procedure Component Value - Date/Time    COVID-19 and FLU A/B PCR - Swab, Nasopharynx [[id number]]  (Normal) Collected: 04/09/23 1108    Lab Status: Final result Specimen: Swab from Nasopharynx Updated: 04/09/23 1138     COVID19 Not Detected     Influenza A PCR Not Detected     Influenza B PCR Not Detected    Narrative:      Fact sheet for providers: https://www.fda.gov/media/652311/download    Fact sheet for patients: https://www.fda.gov/media/839759/download    Test performed by PCR.              I have personally looked at the labs and they are summarized above.  ----------------------------------------------------------------------------------------------------------------------  Detailed radiology reports for the last 24 hours:    Imaging Results (Last 24 Hours)     Procedure Component Value Units Date/Time    XR Chest 1 View [[id number]] Collected: 04/09/23 1250     Updated: 04/09/23 1252    Narrative:        INDICATION:   Fever    TECHNIQUE: 1 views of the chest    COMPARISON:   Chest radiograph 1/13/2021    FINDINGS:    Lungs/Pleura: Increased lung markings. No focal opacity. No effusions.    Mediastinum: Normal mediastinal  contours.  Heart is normal in size.    Other: None      Impression:      Increased lung markings, suspect related to underlying airways disease such as reactive airways disease or bronchitis/bronchiolitis    Signer Name: Cliff Ahn MD   Signed: 4/9/2023 12:50 PM   Workstation Name: [id number]    [facility]    CT Abdomen Pelvis Stone Protocol [[id number]] Collected: 04/09/23 1206     Updated: 04/09/23 1208    Narrative:          INDICATION:   Flank pain    TECHNIQUE:   Routine CT imaging of the kidneys, ureters, and bladder without IV contrast. Coronal and sagittal reformats. Radiation dose reduction techniques included automated exposure control or exposure modulation based on body size. Count of known CT and cardiac  nuc med studies performed in previous 12 months: 0.     COMPARISON:   None available.    FINDINGS:    LOWER THORAX: Subsegmental atelectasis or scarring in the inferior right middle lobe.    RIGHT KIDNEY AND URETER: Small amount of perinephric edema. No urolithiasis. No hydronephrosis.    LEFT KIDNEY AND URETER: No stones, hydronephrosis, or hydroureter.    OTHER SOLID ORGANS: Unremarkable noncontrast appearance of the liver, pancreas, spleen, and adrenal glands.    GALLBLADDER AND BILE DUCTS: Distended gallbladder without wall thickening or surrounding inflammation.    PELVIC ORGANS: Underdistended bladder. No bladder calculus. Anteverted uterus. No adnexal mass. Small amount of free fluid in the cul-de-sac, within physiologic limits.     PERITONEAL CAVITY/GASTROINTESTINAL TRACT:  Moderate gas and stool seen in the colon. No obstruction. Normal appendix.    BODY WALL: Small fat-containing umbilical hernia.     RETROPERITONEUM: Unremarkable.     VASCULAR: Mild aortoiliac atherosclerotic calcification.    BONES: No significant abnormality.      Impression:        1. No urolithiasis or hydronephrosis.  2. Mild asymmetric right perinephric edema. Nonspecific though could  indicate pyelonephritis in the appropriate clinical and laboratory setting. Recommend correlation with urinalysis.    Signer Name: Cliff Ahn MD   Signed: 4/9/2023 12:06 PM   Workstation Name: [id number]    [facility]        I have personally looked at the radiology images and I have read the available final reports.    Assessment & Plan        -Severe sepsis that was present on admission due to right sided pyelopnephritis  -History of bipolar and post partum depression  -Acute hypokalemia and acute hypomagnesemia    Because her blood pressures are borderline low, I have admitted the patient to the progressive care unit just in case she needs vasopressors later.  I will continue giving her IV fluids.  We will give her empiric Rocephin at 2 g daily while we await the urine culture.  We will repeat blood work morning  We will avoid QT prolonging agents and continue to monitor the electrolytes closely. In order to lessen the risk of worsening QTc, the goal potassium level is 4-4.5 and goal magnesium level is 2-2.2.  Please note that I have written for magnesium and potassium replacement.    VTE Prophylaxis:   Mechanical Order History:      Ordered        Signed and Held  Place Sequential Compression Device  Once            Signed and Held  Maintain Sequential Compression Device  Continuous                    Pharmalogical Order History:     None        The patient is high risk due to the following diagnoses/reasons:  Severe sepsis    Anam Ochoa MD  [facility]  04/09/23  20:49 EDT        Electronically signed by Anam Ochoa MD at 04/09/23 2105          Emergency Department Notes      Nga Hernandez, RN at 04/09/23 0824        Patient advises she was diagnosed with a UTI yesterday at urgent care, she was prescribed bactrim and pyridium. States the pain has progressively worsened becoming unbearable this morning,. Patient had a UTI approx 3 months  "ago as well, states she does not think she is not completely emptying her bladder when she urinates, \"my mother was diagnosed with something when she was 3-4 years older than me where she has to take an antibiotic for UTIs all the time and has to self cath.\" Patient complains of severe right flank pain that does improve in certain positions. CVA tenderness noted to the right. Respirations are even and unlabored. Abdomen has generalized tenderness noted that is worse in RLQ. BS + x4 quads via auscultation. GCS 15. All safety measures in place. Call light in reach.    Electronically signed by Nga Hernandez RN at 04/09/23 0998     Gerardo Giron PA at 04/09/23 0918     Attestation signed by Jhoan Waters MD at 04/09/23 9421        SUPERVISE: For this patient encounter, I reviewed the APC's documentation, treatment plan, and medical decision making.  Jhoan Waters MD 4/9/2023 18:57 EDT                         Subjective   History of Present Illness  42-year-old female presents secondary to right flank pain.  Patient states this started Friday.  She was seen by primary care and started on antibiotics for UTI yesterday.  She states that she has continued to have pain.  She hurts with movement.  She states that she has been lifting and pulling a lot over the last week.  She denies any numbness tingling or pain in her legs.  She has had slight burning with urination.  She has had subjective fever along with chills.  She has had some nausea however no vomiting.  She voices no other complaints this time.  She has no past medical history.  She arrived by ambulance.        Review of Systems   Constitutional: Negative.  Negative for fever.   HENT: Negative.    Respiratory: Negative.    Cardiovascular: Negative.  Negative for chest pain.   Gastrointestinal: Negative.  Negative for abdominal pain.   Endocrine: Negative.    Genitourinary: Positive for dysuria, flank pain and frequency.   Skin: Negative.    Neurological: " Negative.    Psychiatric/Behavioral: Negative.    All other systems reviewed and are negative.      No past medical history on file.    No Known Allergies    Past Surgical History:   Procedure Laterality Date   • EXPLORATORY LAPAROTOMY         No family history on file.    Social History     Socioeconomic History   • Marital status: Legally    Tobacco Use   • Smoking status: Every Day     Packs/day: 0.50     Types: Cigarettes   • Smokeless tobacco: Never   Substance and Sexual Activity   • Alcohol use: No   • Drug use: No   • Sexual activity: Defer           Objective   Physical Exam  Vitals and nursing note reviewed.   Constitutional:       General: She is not in acute distress.     Appearance: She is well-developed. She is not diaphoretic.   HENT:      Head: Normocephalic and atraumatic.      Right Ear: External ear normal.      Left Ear: External ear normal.      Nose: Nose normal.   Eyes:      Conjunctiva/sclera: Conjunctivae normal.      Pupils: Pupils are equal, round, and reactive to light.   Neck:      Vascular: No JVD.      Trachea: No tracheal deviation.   Cardiovascular:      Rate and Rhythm: Normal rate and regular rhythm.      Heart sounds: Normal heart sounds. No murmur heard.  Pulmonary:      Effort: Pulmonary effort is normal. No respiratory distress.      Breath sounds: Normal breath sounds. No wheezing.   Abdominal:      General: Bowel sounds are normal.      Palpations: Abdomen is soft.      Tenderness: There is no abdominal tenderness.   Musculoskeletal:         General: No deformity. Normal range of motion.      Cervical back: Normal range of motion and neck supple.   Skin:     General: Skin is warm and dry.      Coloration: Skin is not pale.      Findings: No erythema or rash.   Neurological:      Mental Status: She is alert and oriented to person, place, and time.      Cranial Nerves: No cranial nerve deficit.   Psychiatric:         Behavior: Behavior normal.         Thought Content:  Thought content normal.         Procedures          ED Course  ED Course as of 04/09/23 1310   Sun Apr 09, 2023   1024 Patient's blood pressure dropped to 84/54.  This was rechecked multiple times.  This was not in relationship to any pain medication.  Blood culture and lactic acid had previously been obtained due to patient's white blood cell count.  Sepsis bolus will be started. [JI]   1208 Still awaiting CT. [JI]   1219 Reached out to the hospitalist service.  Blood pressure currently 110/78.  Heart rate is 78 [JI]   1240 Accepted by Dr Silveira. [JI]      ED Course User Index  [JI] Gerardo Giron PA                                           Medical Decision Making  42-year-old female presents secondary to right flank pain.  Patient states this started Friday.  She was seen by primary care and started on antibiotics for UTI yesterday.  She states that she has continued to have pain.  She hurts with movement.  She states that she has been lifting and pulling a lot over the last week.  She denies any numbness tingling or pain in her legs.  She has had slight burning with urination.  She has had subjective fever along with chills.  She has had some nausea however no vomiting.  She voices no other complaints this time.  She has no past medical history.  She arrived by ambulance.    Acute pyelonephritis: acute illness or injury  Hypotension, unspecified hypotension type: acute illness or injury  Sepsis, due to unspecified organism, unspecified whether acute organ dysfunction present: acute illness or injury  Amount and/or Complexity of Data Reviewed  Labs: ordered. Decision-making details documented in ED Course.  Radiology: ordered. Decision-making details documented in ED Course.  Discussion of management or test interpretation with external provider(s): Dr Silveira    Risk  Prescription drug management.  Decision regarding hospitalization.    Risk Details: Patient was agreeable to treatment plan of admission to the  hospital        Final diagnoses:   Sepsis, due to unspecified organism, unspecified whether acute organ dysfunction present   Hypotension, unspecified hypotension type   Acute pyelonephritis       ED Disposition  ED Disposition     ED Disposition   Decision to Admit    Condition   --    Comment   Level of Care: [facility] Care [20]   Diagnosis: Severe sepsis [[id number]]   Certification: I Certify That Inpatient Hospital Services Are Medically Necessary For Greater Than 2 Midnights               No follow-up provider specified.       Medication List      No changes were made to your prescriptions during this visit.          Gerardo Giron PA  04/09/23 1310      Electronically signed by Jhoan Waters MD at 04/09/23 1857     Nga Hernandez, RN at 04/09/23 1030        Patient resting on ED stretcher alert and verbal. Patient continues to complain of pain in right flank, patient educated on hypotensive state and that pain medication will also decrease her blood pressure. ED provider made aware and at bedside. All safety measures in place. Call light in reach. Patient also noted to have arm bent while fluids are infusing, educated to keep arm straight d/t placement of  IV and importance of fluid infusion. Verbalizes understanding.     Electronically signed by Nga Hernandez, RN at 04/09/23 1318     Nga Hernandez, RN at 04/09/23 1343        Attempted to call report to PCU, was advised it was unknown who would be taking the patient at this time. I told orlin alexis to just call back to the ED for report when they figured it out.     Electronically signed by Nga Hernandez, RN at 04/09/23 1352       Vital Signs (last day)     Date/Time Temp Temp src Pulse Resp BP Patient Position SpO2    04/10/23 1200 -- -- 54 -- 110/72 -- 98    04/10/23 1100 -- -- 59 11 97/59 -- 100    04/10/23 1000 -- -- 67 21 104/63 -- 99    04/10/23 0900 -- -- 60 -- 98/47 -- 100    04/10/23 0800 97.8 (36.6) -- -- -- -- -- --    04/10/23 0700  -- -- 53 14 105/65 -- 100    04/10/23 0645 -- -- 43 -- 103/70 -- 98    04/10/23 0630 -- -- 80 -- 108/70 -- 98    04/10/23 0618 -- -- -- -- 100/50 -- --    04/10/23 0600 -- -- 64 -- 97/67 -- 97    04/10/23 0545 -- -- 64 -- 91/63 -- 96    04/10/23 0530 -- -- 67 -- 93/69 -- 98    04/10/23 0515 -- -- 67 -- 89/58 -- 98    04/10/23 0500 -- -- 66 -- 84/61 -- 98    04/10/23 0445 -- -- 73 -- 88/61 -- 94    04/10/23 0430 97.7 (36.5) Oral 66 -- 96/68 -- 94    04/10/23 0415 -- -- 68 -- 89/55 -- 95    04/10/23 0400 -- -- 64 18 86/54 -- 89    04/10/23 0345 -- -- 65 -- 90/63 -- 97    04/10/23 0330 -- -- 80 -- 87/68 -- 99    04/10/23 0315 -- -- 66 -- 83/51 -- 98    04/10/23 0300 -- -- 66 20 91/57 -- 98    04/10/23 0245 -- -- 81 -- 98/66 -- 91    04/10/23 0230 -- -- 71 -- 98/74 -- 99    04/10/23 0215 -- -- 67 -- 93/65 -- 100    04/10/23 0200 -- -- 66 18 84/61 -- 99    04/10/23 0145 -- -- 67 -- 86/60 -- 98    04/10/23 0130 -- -- 62 -- 83/54 -- 98    04/10/23 0115 -- -- 58 -- 97/71 -- 98    04/10/23 0100 -- -- 68 -- 80/56 -- 96    04/10/23 0045 -- -- 65 -- 81/55 -- 97    04/10/23 0030 -- -- 82 -- 90/63 -- 99    04/10/23 0015 -- -- 60 -- 97/55 -- 100    04/10/23 0000 97.7 (36.5) Oral 68 20 84/53 -- 100    04/09/23 2345 -- -- 75 -- 69/31 -- 97    04/09/23 2330 -- -- 71 -- 70/37 -- 97    04/09/23 2315 -- -- 72 -- 77/41 -- 97    04/09/23 2300 -- -- 70 20 73/34 -- 97    04/09/23 2230 -- -- 69 -- 73/37 -- 97    04/09/23 2200 -- -- 73 18 80/50 -- 99    04/09/23 2152 98.7 (37.1) Oral 77 -- 98/58 -- 100    04/09/23 2100 -- -- 82 16 83/44 -- 98    04/09/23 2000 100.2 (37.9) Oral 82 21 103/58 -- 99    04/09/23 1913 100.5 (38.1) Oral 92 -- -- -- 100    04/09/23 1900 -- -- 96 18 105/60 -- 99    04/09/23 1800 99.5 (37.5) Oral 89 22 105/60 -- 100    04/09/23 1700 -- -- 80 -- 108/50 -- 100    04/09/23 1600 -- -- 73 21 96/55 -- 99    04/09/23 1500 -- -- 78 12 96/62 -- 90    04/09/23 1456 98 (36.7) Oral 79 15 106/81 -- 98    04/09/23 1341 98.1 (36.7)  Oral 71 16 107/73 Lying 99    04/09/23 1314 98 (36.7) Oral 70 18 101/72 Lying 99    04/09/23 1123 -- -- 66 -- 92/53 -- 100    04/09/23 1113 -- -- 72 -- 94/51 -- 100    04/09/23 1103 -- -- 70 -- 89/58 -- --    04/09/23 1053 -- -- 71 -- 85/49 -- --    04/09/23 1023 -- -- 65 -- 82/46 -- --    04/09/23 1018 -- -- 56 -- 84/54 -- --    04/09/23 1008 -- -- 71 -- 84/53 -- --    04/09/23 0953 -- -- 72 -- 98/60 -- --    04/09/23 0938 -- -- 60 -- 87/56 -- --    04/09/23 0923 -- -- 67 -- 95/61 -- --    04/09/23 0908 -- -- 77 -- 104/77 -- --    04/09/23 0853 -- -- 76 -- 93/71 -- --    04/09/23 0838 -- -- 80 -- 112/73 -- --    04/09/23 0825 97.9 (36.6) -- 91 16 101/73 -- 98          Intake & Output (last day)       04/09 0701  04/10 0700 04/10 0701  04/11 0700    P.O. 360     I.V. (mL/kg) 585 (8.1)     Total Intake(mL/kg) 945 (13.1)     Urine (mL/kg/hr) 600     Total Output 600     Net +345           Urine Unmeasured Occurrence 2 x           Medication Administration Report for Addis Barcenas as of 04/10/23 1329   Legend:    Given Hold Not Given Due Canceled Entry Other Actions    Time Time (Time) Time  Time-Action       Discontinued     Completed     Future     MAR Hold     Linked           Medications 04/09/23 04/10/23    acetaminophen (TYLENOL) tablet 1,000 mg  Dose: 1,000 mg  Freq: 3 Times Daily Route: PO  Start: 04/09/23 2100   Admin Instructions:   Based on patient request - if ordered for moderate or severe pain, provider allows for administration of a medication prescribed for a lower pain scale.  Do not exceed 4 grams of acetaminophen in a 24 hr period. Max dose of 2gm for AST/ALT greater than 120 units/L    If given for fever, use fever parameter: fever greater than 100.4 °F.    If given for pain, use the following pain scale:   Mild Pain = Pain Score of 1-3, CPOT 1-2  Moderate Pain = Pain Score of 4-6, CPOT 3-4  Severe Pain = Pain Score of 7-10, CPOT 5-8    1931-Given [C]     2132-Canceled Entry            0954-Given     1600     2100             cefTRIAXone (ROCEPHIN) 2 g in sodium chloride 0.9 % 100 mL IVPB-VTB  Dose: 2 g  Freq: Every 24 Hours Route: IV  Indications of Use: SEPSIS,URINARY TRACT INFECTION  Start: 04/10/23 0900   End: 04/15/23 0859   Admin Instructions:   LR should be paused and flushing of the line with NS is recommended prior to and after completion of ceftriaxone infusion due to incompatibility. Do not co-adminster with calcium-containing solutions.  Caution: Look alike/sound alike drug alert     0915-New Bag               cetirizine (zyrTEC) tablet 10 mg  Dose: 10 mg  Freq: Daily Route: PO  Start: 04/10/23 0900     0954-Given               sodium chloride 0.9 % flush 3 mL  Dose: 3 mL  Freq: Every 12 Hours Scheduled Route: IV  Start: 04/09/23 2100 2128-Given            0915-Given     2100             Completed Medications  Medications 04/09/23 04/10/23       calcium carbonate (TUMS) chewable tablet 500 mg (200 mg elemental)  Dose: 2 tablet  Freq: Once Route: PO  Start: 04/09/23 1407   End: 04/09/23 1416   Admin Instructions:   One tablet contains 200 mg elemental calcium.  Take with food.    1416-Given                cefTRIAXone (ROCEPHIN) 2 g in sodium chloride 0.9 % 100 mL IVPB-VTB  Dose: 2 g  Freq: Once Route: IV  Indications of Use: URINARY TRACT INFECTION  Start: 04/09/23 0925   End: 04/09/23 1049   Admin Instructions:   LR should be paused and flushing of the line with NS is recommended prior to and after completion of ceftriaxone infusion due to incompatibility. Do not co-adminster with calcium-containing solutions.  Caution: Look alike/sound alike drug alert    1019-New Bag     1049-Stopped               ketorolac (TORADOL) injection 15 mg  Dose: 15 mg  Freq: Once Route: IV  Start: 04/09/23 0826   End: 04/09/23 0831   Admin Instructions:   Based on patient request - if ordered for moderate or severe pain, provider allows for administration of a medication prescribed for a lower pain  scale.      If given for pain, use the following pain scale:  Mild Pain = Pain Score of 1-3, CPOT 1-2  Moderate Pain = Pain Score of 4-6, CPOT 3-4  Severe Pain = Pain Score of 7-10, CPOT 5-8    0831-Given                lactated ringers bolus 1,500 mL  Dose: 1,500 mL  Freq: Once Route: IV  Start: 04/09/23 2300   End: 04/10/23 0014    2214-New Bag                Magnesium Sulfate - Total Dose 4 grams - Magnesium 1.6 - 1.9  Dose: 4 g  Freq: Once Route: IV  Start: 04/09/23 2200   End: 04/10/23 0128   Admin Instructions:   Mg 1.6 - 1.9  Administer 4 grams over 4 Hours x1 Dose  Recheck Magnesium in AM    2128-New Bag                morphine injection 4 mg  Dose: 4 mg  Freq: Once Route: IV  Start: 04/09/23 1316   End: 04/09/23 1331   Admin Instructions:   Based on patient request - if ordered for moderate or severe pain, provider allows for administration of a medication prescribed for a lower pain scale.  If given for pain, use the following pain scale:  Mild Pain = Pain Score of 1-3, CPOT 1-2  Moderate Pain = Pain Score of 4-6, CPOT 3-4  Severe Pain = Pain Score of 7-10, CPOT 5-8    1331-Given                ondansetron (ZOFRAN) injection 4 mg  Dose: 4 mg  Freq: Once Route: IV  Start: 04/09/23 1417   End: 04/09/23 1419    1419-Given                ondansetron (ZOFRAN) injection 4 mg  Dose: 4 mg  Freq: Once Route: IV  Start: 04/09/23 0826   End: 04/09/23 0831    0831-Given                potassium chloride (K-DUR,KLOR-CON) CR tablet 40 mEq  Dose: 40 mEq  Freq: Every 4 Hours Route: PO  Start: 04/09/23 2200   End: 04/10/23 0109   Admin Instructions:   Potassium 3.1 or Less Give KCl 40 mEq q4h x3 Doses   Potassium 3.2 - 3.6 Give KCl 40 mEq q4h x2 Doses     Check Potassium 4 Hours After Last Dose Given   Check Magnesium if Potassium Level Remains Low After Replacement   DO NOT GIVE if CrCl is Less Than 30 mL/minute or Urine Output Less Than 30 mL/hr    2128-Given            0109-Given               sodium chloride 0.9 %  bolus 2,040 mL  Dose: 30 mL/kg  Weight Dosing Info: 68 kg  Freq: Once Route: IV  Start: 04/09/23 1026   End: 04/09/23 1314   Admin Instructions:   You may need to adjust the volume of this order to account for fluids already given. The total of the bolus(es) given should be as close to 30 mL/kg without going under.    1026-New Bag     1314-Stopped              Discontinued Medications  Medications 04/09/23 04/10/23       morphine injection 4 mg  Dose: 4 mg  Freq: Once Route: IV  Start: 04/09/23 1012   End: 04/09/23 2208   Admin Instructions:   Based on patient request - if ordered for moderate or severe pain, provider allows for administration of a medication prescribed for a lower pain scale.  If given for pain, use the following pain scale:  Mild Pain = Pain Score of 1-3, CPOT 1-2  Moderate Pain = Pain Score of 4-6, CPOT 3-4  Severe Pain = Pain Score of 7-10, CPOT 5-8    (1019)-Not Given [C]                morphine injection 4 mg  Dose: 4 mg  Freq: Once Route: IV  Start: 04/09/23 0930   End: 04/09/23 2208   Admin Instructions:   Based on patient request - if ordered for moderate or severe pain, provider allows for administration of a medication prescribed for a lower pain scale.  If given for pain, use the following pain scale:  Mild Pain = Pain Score of 1-3, CPOT 1-2  Moderate Pain = Pain Score of 4-6, CPOT 3-4  Severe Pain = Pain Score of 7-10, CPOT 5-8    (1019)-Not Given [C]                      and   Medication Administration Report for Addis Barcenas as of 04/10/23 1329   Legend:    Given Hold Not Given Due Canceled Entry Other Actions    Time Time (Time) Time  Time-Action       Discontinued     Completed     Future     MAR Hold     Linked           Medications 04/09/23 04/10/23    lactated ringers infusion  Rate: 100 mL/hr Dose: 100 mL/hr  Freq: Continuous Route: IV  Start: 04/10/23 0200     0109-New Bag     1053-New Bag              norepinephrine (LEVOPHED) 8 mg in 250 mL NS infusion (premix)  Rate:  2.73-41.01 mL/hr Dose: 0.02-0.3 mcg/kg/min  Weight Dosing Info: 72.9 kg  Freq: Titrated Route: IV  Start: 04/10/23 0045   Admin Instructions:   Initiate infusion at 0.02 mcg/kg/min and titrate by 0.02 - 0.06 mcg/kg/min every 5 - 10 minutes to use the lowest dose possible to maintain a MAP greater than or equal To 65 mmHg. Maximum Dose = 0.3 mcg/kg/min. Contact provider if unable to maintain MAP target at the maximum dose or if MAP is greater than 95 mmHg. Once MAP target achieved, obtain vitals a minimum of every 30 minutes.  With provider order may titrate to maximum dose of 0.5 mcg/kg/min.  Concentration 8 mg/250 mL          Central line preferred, if unavailable use large bore IV access with frequent nurse monitoring of IV site.     0130-Hold [C]                      Lab Results (all)     Procedure Component Value Units Date/Time    Urine Culture - Urine, Urine, Clean Catch [[id number]] Collected: 04/09/23 0823    Specimen: Urine, Clean Catch Updated: 04/10/23 1313     Urine Culture >100,000 CFU/mL Mixed Na Isolated    Narrative:      Specimen contains mixed organisms of questionable pathogenicity suggestive of contamination. If symptoms persist, suggest recollection.  Colonization of the urinary tract without infection is common. Treatment is discouraged unless the patient is symptomatic, pregnant, or undergoing an invasive urologic procedure.    Blood Culture - Blood, Hand, Right [[id number]]  (Normal) Collected: 04/09/23 1007    Specimen: Blood from Hand, Right Updated: 04/10/23 1015     Blood Culture No growth at 24 hours    Potassium [[id number]]  (Normal) Collected: 04/10/23 0527    Specimen: Blood Updated: 04/10/23 0602     Potassium 4.3 mmol/L     Blood Culture - Blood, Hand, Left [[id number]]  (Abnormal) Collected: 04/09/23 0957    Specimen: Blood from Hand, Left Updated: 04/10/23 0541     Blood Culture Abnormal Stain     Gram Stain Aerobic Bottle Gram negative bacilli    Blood Culture ID, PCR - Blood,  Hand, Left [[id number]]  (Abnormal) Collected: 04/09/23 0957    Specimen: Blood from Hand, Left Updated: 04/10/23 0541     BCID, PCR Escherichia coli. Identification by BCID2 PCR.     BOTTLE TYPE Aerobic Bottle    Narrative:      No resistance genes detected.    Magnesium [[id number]]  (Abnormal) Collected: 04/10/23 0031    Specimen: Blood Updated: 04/10/23 0132     Magnesium 3.0 mg/dL     Comprehensive Metabolic Panel [[id number]]  (Abnormal) Collected: 04/10/23 0031    Specimen: Blood Updated: 04/10/23 0130     Glucose 121 mg/dL      BUN 8 mg/dL      Creatinine 0.74 mg/dL      Sodium 138 mmol/L      Potassium 3.9 mmol/L      Chloride 108 mmol/L      CO2 23.0 mmol/L      Calcium 8.5 mg/dL      Total Protein 5.2 g/dL      Albumin 2.7 g/dL      ALT (SGPT) 15 U/L      AST (SGOT) 19 U/L      Alkaline Phosphatase 62 U/L      Total Bilirubin 0.3 mg/dL      Globulin 2.5 gm/dL      A/G Ratio 1.1 g/dL      BUN/Creatinine Ratio 10.8     Anion Gap 7.0 mmol/L      eGFR 103.1 mL/min/1.73     Narrative:      GFR Normal >60  Chronic Kidney Disease <60  Kidney Failure <15      Phosphorus [[id number]]  (Normal) Collected: 04/10/23 0031    Specimen: Blood Updated: 04/10/23 0130     Phosphorus 2.7 mg/dL     CBC & Differential [[id number]]  (Abnormal) Collected: 04/10/23 0031    Specimen: Blood Updated: 04/10/23 0109    Narrative:      The following orders were created for panel order CBC & Differential.  Procedure                               Abnormality         Status                     ---------                               -----------         ------                     CBC Auto Differential[[id number]]        Abnormal            Final result                 Please view results for these tests on the individual orders.    CBC Auto Differential [[id number]]  (Abnormal) Collected: 04/10/23 0031    Specimen: Blood Updated: 04/10/23 0109     WBC 12.40 10*3/mm3      RBC 3.48 10*6/mm3      Hemoglobin 10.2 g/dL      Hematocrit 32.5 %      MCV  93.4 fL      MCH 29.3 pg      MCHC 31.4 g/dL      RDW 14.2 %      RDW-SD 48.3 fl      MPV 9.9 fL      Platelets 174 10*3/mm3      Neutrophil % 75.1 %      Lymphocyte % 8.4 %      Monocyte % 9.8 %      Eosinophil % 6.1 %      Basophil % 0.2 %      Immature Grans % 0.4 %      Neutrophils, Absolute 9.30 10*3/mm3      Lymphocytes, Absolute 1.04 10*3/mm3      Monocytes, Absolute 1.22 10*3/mm3      Eosinophils, Absolute 0.76 10*3/mm3      Basophils, Absolute 0.03 10*3/mm3      Immature Grans, Absolute 0.05 10*3/mm3      nRBC 0.0 /100 WBC     Urine Drug Screen - Urine, Clean Catch [[id number]]  (Abnormal) Collected: 04/09/23 0823    Specimen: Urine, Clean Catch Updated: 04/09/23 1405     THC, Screen, Urine Negative     Phencyclidine (PCP), Urine Negative     Cocaine Screen, Urine Negative     Methamphetamine, Ur Negative     Opiate Screen Positive     Amphetamine Screen, Urine Positive     Benzodiazepine Screen, Urine Negative     Tricyclic Antidepressants Screen Negative     Methadone Screen, Urine Negative     Barbiturates Screen, Urine Negative     Oxycodone Screen, Urine Negative     Propoxyphene Screen Negative     Buprenorphine, Screen, Urine Negative    Narrative:      Cutoff For Drugs Screened:    Amphetamines               500 ng/ml  Barbiturates               200 ng/ml  Benzodiazepines            150 ng/ml  Cocaine                    150 ng/ml  Methadone                  200 ng/ml  Opiates                    100 ng/ml  Phencyclidine               25 ng/ml  THC                            50 ng/ml  Methamphetamine            500 ng/ml  Tricyclic Antidepressants  300 ng/ml  Oxycodone                  100 ng/ml  Propoxyphene               300 ng/ml  Buprenorphine               10 ng/ml    The normal value for all drugs tested is negative. This report includes unconfirmed screening results, with the cutoff values listed, to be used for medical treatment purposes only.  Unconfirmed results must not be used for  non-medical purposes such as employment or legal testing.  Clinical consideration should be applied to any drug of abuse test, particularly when unconfirmed results are used.      TSH [[id number]]  (Normal) Collected: 04/09/23 0854    Specimen: Blood from Arm, Right Updated: 04/09/23 1256     TSH 0.539 uIU/mL     Hemoglobin A1c [[id number]]  (Abnormal) Collected: 04/09/23 0854    Specimen: Blood from Arm, Right Updated: 04/09/23 1249     Hemoglobin A1C 4.50 %     Narrative:      Hemoglobin A1C Ranges:    Increased Risk for Diabetes  5.7% to 6.4%  Diabetes                     >= 6.5%  Diabetic Goal                < 7.0%    Magnesium [[id number]]  (Normal) Collected: 04/09/23 0854    Specimen: Blood from Arm, Right Updated: 04/09/23 1243     Magnesium 1.7 mg/dL     COVID-19 and FLU A/B PCR - Swab, Nasopharynx [[id number]]  (Normal) Collected: 04/09/23 1108    Specimen: Swab from Nasopharynx Updated: 04/09/23 1138     COVID19 Not Detected     Influenza A PCR Not Detected     Influenza B PCR Not Detected    Narrative:      Fact sheet for providers: https://www.fda.gov/media/107456/download    Fact sheet for patients: https://www.fda.gov/media/022859/download    Test performed by PCR.    Lactic Acid, Plasma [[id number]]  (Normal) Collected: 04/09/23 1007    Specimen: Blood from Hand, Right Updated: 04/09/23 1035     Lactate 0.8 mmol/L     Eva Draw [[id number]] Collected: 04/09/23 0854    Specimen: Blood from Arm, Right Updated: 04/09/23 1000    Narrative:      The following orders were created for panel order Eva Draw.  Procedure                               Abnormality         Status                     ---------                               -----------         ------                     Green Top (Gel)[[id number]]                                  Final result               Lavender Top[[id number]]                                     Final result               Gold Top - SST[[id number]]                                                               Light Blue Top[[id number]]                                   Final result                 Please view results for these tests on the individual orders.    Green Top (Gel) [[id number]] Collected: 04/09/23 0854    Specimen: Blood from Arm, Right Updated: 04/09/23 1000     Extra Tube Hold for add-ons.     Comment: Auto resulted.       Lavender Top [[id number]] Collected: 04/09/23 0854    Specimen: Blood from Arm, Right Updated: 04/09/23 1000     Extra Tube hold for add-on     Comment: Auto resulted       Light Blue Top [[id number]] Collected: 04/09/23 0854    Specimen: Blood from Arm, Right Updated: 04/09/23 1000     Extra Tube Hold for add-ons.     Comment: Auto resulted       Comprehensive Metabolic Panel [[id number]]  (Abnormal) Collected: 04/09/23 0854    Specimen: Blood from Arm, Right Updated: 04/09/23 0918     Glucose 118 mg/dL      BUN 11 mg/dL      Creatinine 0.84 mg/dL      Sodium 138 mmol/L      Potassium 3.2 mmol/L      Chloride 104 mmol/L      CO2 21.6 mmol/L      Calcium 9.5 mg/dL      Total Protein 6.6 g/dL      Albumin 3.8 g/dL      ALT (SGPT) 18 U/L      AST (SGOT) 22 U/L      Alkaline Phosphatase 69 U/L      Total Bilirubin 0.6 mg/dL      Globulin 2.8 gm/dL      A/G Ratio 1.4 g/dL      BUN/Creatinine Ratio 13.1     Anion Gap 12.4 mmol/L      eGFR 88.6 mL/min/1.73     Narrative:      GFR Normal >60  Chronic Kidney Disease <60  Kidney Failure <15      hCG, Serum, Qualitative [[id number]]  (Normal) Collected: 04/09/23 0854    Specimen: Blood from Arm, Right Updated: 04/09/23 0911     HCG Qualitative Negative    CBC & Differential [[id number]]  (Abnormal) Collected: 04/09/23 0854    Specimen: Blood from Arm, Right Updated: 04/09/23 0859    Narrative:      The following orders were created for panel order CBC & Differential.  Procedure                               Abnormality         Status                     ---------                               -----------         ------                      CBC Auto Differential[[id number]]        Abnormal            Final result                 Please view results for these tests on the individual orders.    CBC Auto Differential [[id number]]  (Abnormal) Collected: 04/09/23 0854    Specimen: Blood from Arm, Right Updated: 04/09/23 0859     WBC 15.34 10*3/mm3      RBC 4.25 10*6/mm3      Hemoglobin 12.4 g/dL      Hematocrit 38.4 %      MCV 90.4 fL      MCH 29.2 pg      MCHC 32.3 g/dL      RDW 14.3 %      RDW-SD 47.5 fl      MPV 9.3 fL      Platelets 201 10*3/mm3      Neutrophil % 86.6 %      Lymphocyte % 2.4 %      Monocyte % 6.6 %      Eosinophil % 3.3 %      Basophil % 0.3 %      Immature Grans % 0.8 %      Neutrophils, Absolute 13.30 10*3/mm3      Lymphocytes, Absolute 0.37 10*3/mm3      Monocytes, Absolute 1.01 10*3/mm3      Eosinophils, Absolute 0.50 10*3/mm3      Basophils, Absolute 0.04 10*3/mm3      Immature Grans, Absolute 0.12 10*3/mm3      nRBC 0.0 /100 WBC     Urinalysis, Microscopic Only - Urine, Clean Catch [[id number]]  (Abnormal) Collected: 04/09/23 0823    Specimen: Urine, Clean Catch Updated: 04/09/23 0845     RBC, UA 13-20 /HPF      WBC, UA 13-20 /HPF      Bacteria, UA 3+ /HPF      Squamous Epithelial Cells, UA 21-30 /HPF      Hyaline Casts, UA 13-20 /LPF      Methodology Manual Light Microscopy    Urinalysis With Culture If Indicated - Urine, Clean Catch [[id number]]  (Abnormal) Collected: 04/09/23 0823    Specimen: Urine, Clean Catch Updated: 04/09/23 0839     Color, UA Orange     Comment: Dipstick results may be inaccurate due to color interference.            Appearance, UA Turbid     pH, UA 5.5     Specific Gravity, UA 1.027     Glucose, UA Negative     Ketones, UA >=160 mg/dL (4+)     Bilirubin, UA Small (1+)     Blood, UA Moderate (2+)     Protein,  mg/dL (2+)     Leuk Esterase, UA Small (1+)     Nitrite, UA Positive     Urobilinogen, UA 1.0 E.U./dL    Narrative:      In absence of clinical symptoms, the presence of pyuria,  bacteria, and/or nitrites on the urinalysis result does not correlate with infection.          Imaging Results (All)     Procedure Component Value Units Date/Time    XR Chest 1 View [[id number]] Collected: 04/09/23 1250     Updated: 04/09/23 1252    Narrative:        INDICATION:   Fever    TECHNIQUE: 1 views of the chest    COMPARISON:   Chest radiograph 1/13/2021    FINDINGS:    Lungs/Pleura: Increased lung markings. No focal opacity. No effusions.    Mediastinum: Normal mediastinal contours.  Heart is normal in size.    Other: None      Impression:      Increased lung markings, suspect related to underlying airways disease such as reactive airways disease or bronchitis/bronchiolitis    Signer Name: Cliff Ahn MD   Signed: 4/9/2023 12:50 PM   Workstation Name: [id number]    [facility]    CT Abdomen Pelvis Stone Protocol [[id number]] Collected: 04/09/23 1206     Updated: 04/09/23 1208    Narrative:          INDICATION:   Flank pain    TECHNIQUE:   Routine CT imaging of the kidneys, ureters, and bladder without IV contrast. Coronal and sagittal reformats. Radiation dose reduction techniques included automated exposure control or exposure modulation based on body size. Count of known CT and cardiac  nuc med studies performed in previous 12 months: 0.     COMPARISON:   None available.    FINDINGS:    LOWER THORAX: Subsegmental atelectasis or scarring in the inferior right middle lobe.    RIGHT KIDNEY AND URETER: Small amount of perinephric edema. No urolithiasis. No hydronephrosis.    LEFT KIDNEY AND URETER: No stones, hydronephrosis, or hydroureter.    OTHER SOLID ORGANS: Unremarkable noncontrast appearance of the liver, pancreas, spleen, and adrenal glands.    GALLBLADDER AND BILE DUCTS: Distended gallbladder without wall thickening or surrounding inflammation.    PELVIC ORGANS: Underdistended bladder. No bladder calculus. Anteverted uterus. No adnexal mass. Small amount of free fluid  in the cul-de-sac, within physiologic limits.     PERITONEAL CAVITY/GASTROINTESTINAL TRACT:  Moderate gas and stool seen in the colon. No obstruction. Normal appendix.    BODY WALL: Small fat-containing umbilical hernia.     RETROPERITONEUM: Unremarkable.     VASCULAR: Mild aortoiliac atherosclerotic calcification.    BONES: No significant abnormality.      Impression:        1. No urolithiasis or hydronephrosis.  2. Mild asymmetric right perinephric edema. Nonspecific though could indicate pyelonephritis in the appropriate clinical and laboratory setting. Recommend correlation with urinalysis.    Signer Name: Cliff Ahn MD   Signed: 4/9/2023 12:06 PM   Workstation Name: [id number]    [facility]          Orders (all)      Start     Ordered    04/11/23 0600  Magnesium  Morning Draw         04/10/23 0127    04/11/23 0600  CBC & Differential  Morning Draw         04/10/23 0747    04/11/23 0600  Basic Metabolic Panel  Morning Draw         04/10/23 0747    04/11/23 0600  Magnesium  Morning Draw         04/10/23 0747    04/10/23 0900  cetirizine (zyrTEC) tablet 10 mg  Daily         04/09/23 2044    04/10/23 0900  cefTRIAXone (ROCEPHIN) 2 g in sodium chloride 0.9 % 100 mL IVPB-VTB  Every 24 Hours         04/09/23 2022    04/10/23 0600  Phosphorus  Morning Draw         04/09/23 2023    04/10/23 0600  Magnesium  Morning Draw         04/09/23 2023    04/10/23 0600  CBC & Differential  Morning Draw         04/09/23 2023    04/10/23 0600  Comprehensive Metabolic Panel  Morning Draw         04/09/23 2023    04/10/23 0600  CBC Auto Differential  PROCEDURE ONCE         04/09/23 2204    04/10/23 0530  Potassium  Timed         04/10/23 0127    04/10/23 0430  Blood Culture ID, PCR - Blood, Hand, Left  Once         04/10/23 0429    04/10/23 0200  lactated ringers infusion  Continuous         04/10/23 0105    04/10/23 0045  norepinephrine (LEVOPHED) 8 mg in 250 mL NS infusion (premix)  Titrated          04/09/23 2357 04/09/23 2300  lactated ringers bolus 1,500 mL  Once         04/09/23 2208 04/09/23 2200  Magnesium Sulfate - Total Dose 4 grams - Magnesium 1.6 - 1.9  Once         04/09/23 2034 04/09/23 2200  potassium chloride (K-DUR,KLOR-CON) CR tablet 40 mEq  Every 4 Hours         04/09/23 2034 04/09/23 2100  sodium chloride 0.9 % flush 3 mL  Every 12 Hours Scheduled         04/09/23 1446 04/09/23 2100  acetaminophen (TYLENOL) tablet 1,000 mg  3 Times Daily         04/09/23 1916 04/09/23 2044  ondansetron ODT (ZOFRAN-ODT) disintegrating tablet 4 mg  Every 6 Hours PRN         04/09/23 2044 04/09/23 2044  SUMAtriptan (IMITREX) tablet 50 mg  Once As Needed         04/09/23 2044 04/09/23 2025  Patient Currently On Electrolyte Replacement Protocol - Please Refer to MAR for Protocol Details  Misc Nursing Order (Specify)  Daily      Comments: Patient Currently On Electrolyte Replacement Protocol - Please Refer to MAR for Protocol Details    04/09/23 2024 04/09/23 2024  potassium & sodium phosphates (PHOS-NAK) 280-160-250 MG packet - for Phosphorus less than 1.25 mg/dL  Every 6 Hours PRN        See Hyperspace for full Linked Orders Report.    04/09/23 2024 04/09/23 2024  potassium & sodium phosphates (PHOS-NAK) 280-160-250 MG packet - for Phosphorus 1.25 - 2.5 mg/dL  Every 6 Hours PRN        See Hyperspace for full Linked Orders Report.    04/09/23 2024 04/09/23 2024  Magnesium Sulfate - Total Dose 10 grams - Magnesium 1 or Less  As Needed        See Hyperspace for full Linked Orders Report.    04/09/23 2024 04/09/23 2024  Magnesium Sulfate - Total Dose 6 grams - Magnesium 1.1 - 1.5  As Needed        See Hyperspace for full Linked Orders Report.    04/09/23 2024 04/09/23 2024  Magnesium Sulfate - Total Dose 4 grams - Magnesium 1.6 - 1.9  As Needed        See Hyperspace for full Linked Orders Report.    04/09/23 2024 04/09/23 2024  potassium chloride (K-DUR,KLOR-CON) CR tablet 40  mEq  As Needed        See Hyperspace for full Linked Orders Report.    04/09/23 2024 04/09/23 2024  potassium chloride (KLOR-CON) packet 40 mEq  As Needed        See Hyperspace for full Linked Orders Report.    04/09/23 2024 04/09/23 2024  potassium chloride 10 mEq in 100 mL IVPB  Every 1 Hour PRN        See Hyperspace for full Linked Orders Report.    04/09/23 2024 04/09/23 2023  Code Status and Medical Interventions:  Continuous         04/09/23 2022 04/09/23 2022  ECG 12 Lead Other; sepsis  Once         04/09/23 2022 04/09/23 1916  ketorolac (TORADOL) injection 30 mg  Every 6 Hours PRN         04/09/23 1916 04/09/23 1915  oxyCODONE (ROXICODONE) immediate release tablet 5 mg  Every 4 Hours PRN         04/09/23 1916 04/09/23 1800  Vital Signs  Every 6 Hours         04/09/23 1446    04/09/23 1800  Intake & Output  Every 6 Hours         04/09/23 1446    04/09/23 1800  Oral Care  2 Times Daily       04/09/23 1446    04/09/23 1447  Weigh Patient  Once         04/09/23 1446    04/09/23 1447  Notify PCP of Patient Admission  Once         04/09/23 1446    04/09/23 1447  Insert Peripheral IV  Once         04/09/23 1446    04/09/23 1447  Saline Lock & Maintain IV Access  Continuous         04/09/23 1446    04/09/23 1447  Place Sequential Compression Device  Once         04/09/23 1446    04/09/23 1447  Maintain Sequential Compression Device  Continuous         04/09/23 1446    04/09/23 1447  Diet: Regular/House Diet; Texture: Regular Texture (IDDSI 7); Fluid Consistency: Thin (IDDSI 0)  Diet Effective Now         04/09/23 1446    04/09/23 1446  sodium chloride 0.9 % flush 3-10 mL  As Needed         04/09/23 1446    04/09/23 1446  sodium chloride 0.9 % infusion 40 mL  As Needed         04/09/23 1446    04/09/23 1417  ondansetron (ZOFRAN) injection 4 mg  Once         04/09/23 1415    04/09/23 1407  calcium carbonate (TUMS) chewable tablet 500 mg (200 mg elemental)  Once         04/09/23 1405    04/09/23  1316  morphine injection 4 mg  Once         04/09/23 1314    04/09/23 1315  Sepsis Focused Exam Attestation Order  Once        Comments: I attest that I reassessed tissue perfusion after fluid resuscitation was completed    04/09/23 1314    04/09/23 1243  Inpatient Admission  Once         04/09/23 1243    04/09/23 1238  Urine Drug Screen - Urine, Clean Catch  Once         04/09/23 1237    04/09/23 1233  Magnesium  Once         04/09/23 1232    04/09/23 1233  TSH  Once         04/09/23 1233    04/09/23 1233  Hemoglobin A1c  Once         04/09/23 1233    04/09/23 1138  XR Chest 1 View  1 Time Imaging         04/09/23 1137    04/09/23 1042  COVID-19 and FLU A/B PCR - Swab, Nasopharynx  Once         04/09/23 1041    04/09/23 1026  sodium chloride 0.9 % bolus 2,040 mL  Once         04/09/23 1024    04/09/23 1012  morphine injection 4 mg  Once,   Status:  Discontinued         04/09/23 1010    04/09/23 0930  morphine injection 4 mg  Once,   Status:  Discontinued         04/09/23 0928    04/09/23 0925  cefTRIAXone (ROCEPHIN) 2 g in sodium chloride 0.9 % 100 mL IVPB-VTB  Once         04/09/23 0923    04/09/23 0924  CT Abdomen Pelvis Stone Protocol  1 Time Imaging         04/09/23 0923    04/09/23 0923  Lactic Acid, Plasma  Once         04/09/23 0923    04/09/23 0923  Blood Culture - Blood, Hand, Left  Once        See Hyperspace for full Linked Orders Report.    04/09/23 0923    04/09/23 0923  Blood Culture - Blood, Hand, Right  Once        See Hyperspace for full Linked Orders Report.    04/09/23 0923    04/09/23 0846  Urine Culture - Urine, Urine, Clean Catch  Once         04/09/23 0845    04/09/23 0837  Urinalysis, Microscopic Only - Urine, Clean Catch  Once         04/09/23 0836    04/09/23 0826  ketorolac (TORADOL) injection 15 mg  Once         04/09/23 0824    04/09/23 0826  ondansetron (ZOFRAN) injection 4 mg  Once         04/09/23 0824    04/09/23 0819  CBC & Differential  Once         04/09/23 0818    04/09/23 0819   Comprehensive Metabolic Panel  Once         04/09/23 0818    04/09/23 0819  Urinalysis With Culture If Indicated - Urine, Clean Catch  Once         04/09/23 0818    04/09/23 0819  [place] Draw  Once         04/09/23 0818    04/09/23 0819  hCG, Serum, Qualitative  STAT         04/09/23 0818    04/09/23 0819  CBC Auto Differential  PROCEDURE ONCE         04/09/23 0818    04/09/23 0819  Green Top (Gel)  PROCEDURE ONCE         04/09/23 0818    04/09/23 0819  Lavender Top  PROCEDURE ONCE         04/09/23 0818    04/09/23 0819  Gold Top - SST  PROCEDURE ONCE,   Status:  Canceled         04/09/23 0818    04/09/23 0819  Light Blue Top  PROCEDURE ONCE         04/09/23 0818    04/08/23 0000  phenazopyridine (PYRIDIUM) 200 MG tablet  3 Times Daily         04/09/23 1544    04/08/23 0000  nitrofurantoin, macrocrystal-monohydrate, (MACROBID) 100 MG capsule  2 Times Daily With Meals         04/09/23 1544    03/17/23 0000  SUMAtriptan (IMITREX) 50 MG tablet  Once As Needed         04/09/23 1544    03/17/23 0000  levocetirizine (XYZAL) 5 MG tablet  Daily         04/09/23 1544    03/17/23 0000  fluticasone (FLONASE) 50 MCG/ACT nasal spray  Daily,   Status:  Discontinued         04/09/23 1544    Unscheduled  Initiate & Follow Electrolyte Replacement Protocol  As Needed       04/09/23 2024    Unscheduled  Magnesium  As Needed       04/09/23 2024    Unscheduled  Potassium  As Needed       04/09/23 2024    Unscheduled  Magnesium  As Needed      Comments: In AM After Magnesium Supplementation      04/09/23 2024    --  acetaminophen (TYLENOL) 500 MG tablet  Every 4 Hours PRN         04/09/23 1911    --  phentermine (ADIPEX-P) 37.5 MG tablet  Every Morning Before Breakfast         04/09/23 1912                Physician Progress Notes (all)    No notes of this type exist for this encounter.         Consult Notes (all)    No notes of this type exist for this encounter.

## 2023-04-10 NOTE — PLAN OF CARE
Problem: Adult Inpatient Plan of Care  Goal: Plan of Care Review  Outcome: Ongoing, Progressing   Pt resting at this time. VSS on RA. Pt has been hypotensive at times, bolus and MIVF given. BP stable for now. PRN meds given for pain/discomfort. Pt updated on plan of care. No questions/concerns at this time.

## 2023-04-10 NOTE — PLAN OF CARE
Problem: Adjustment to Illness (Sepsis/Septic Shock)  Goal: Optimal Coping  Outcome: Ongoing, Progressing  Intervention: Optimize Psychosocial Adjustment to Illness  Recent Flowsheet Documentation  Taken 4/10/2023 1430 by Melisa Murray RN  Supportive Measures:   active listening utilized   decision-making supported   verbalization of feelings encouraged  Family/Support System Care: self-care encouraged  Taken 4/10/2023 0900 by Melisa Murray RN  Supportive Measures:   active listening utilized   decision-making supported   verbalization of feelings encouraged  Family/Support System Care: self-care encouraged     Problem: Bleeding (Sepsis/Septic Shock)  Goal: Absence of Bleeding  Outcome: Ongoing, Progressing     Problem: Glycemic Control Impaired (Sepsis/Septic Shock)  Goal: Blood Glucose Level Within Desired Range  Outcome: Ongoing, Progressing  Intervention: Optimize Glycemic Control  Recent Flowsheet Documentation  Taken 4/10/2023 1430 by Melisa Murray RN  Glycemic Management: oral hydration promoted  Taken 4/10/2023 0900 by Melisa Murray RN  Glycemic Management: oral hydration promoted     Problem: Infection Progression (Sepsis/Septic Shock)  Goal: Absence of Infection Signs and Symptoms  Outcome: Ongoing, Progressing  Intervention: Initiate Sepsis Management  Recent Flowsheet Documentation  Taken 4/10/2023 1500 by Melisa Murray RN  Infection Prevention: rest/sleep promoted  Taken 4/10/2023 1300 by Melisa Murray RN  Infection Prevention: rest/sleep promoted  Taken 4/10/2023 1100 by Melisa Murray RN  Infection Prevention: rest/sleep promoted  Taken 4/10/2023 0900 by Melisa Murray RN  Infection Prevention: rest/sleep promoted  Taken 4/10/2023 0700 by Melisa Murray RN  Infection Prevention: rest/sleep promoted  Intervention: Promote Recovery  Recent Flowsheet Documentation  Taken 4/10/2023 1430 by Melisa Murray RN  Sleep/Rest Enhancement:   awakenings minimized    consistent schedule promoted   relaxation techniques promoted  Taken 4/10/2023 0900 by Melisa Murary RN  Sleep/Rest Enhancement:   awakenings minimized   consistent schedule promoted   regular sleep/rest pattern promoted  Intervention: Promote Stabilization  Recent Flowsheet Documentation  Taken 4/10/2023 1430 by Melisa Murray RN  Fluid/Electrolyte Management: fluids provided  Taken 4/10/2023 0900 by Melisa Murray RN  Fluid/Electrolyte Management: fluids provided     Problem: Nutrition Impaired (Sepsis/Septic Shock)  Goal: Optimal Nutrition Intake  Outcome: Ongoing, Progressing     Problem: Skin Injury Risk Increased  Goal: Skin Health and Integrity  Outcome: Ongoing, Progressing  Intervention: Optimize Skin Protection  Recent Flowsheet Documentation  Taken 4/10/2023 1430 by Melisa Murray RN  Pressure Reduction Techniques: frequent weight shift encouraged  Pressure Reduction Devices: pressure-redistributing mattress utilized  Skin Protection:   adhesive use limited   incontinence pads utilized   tubing/devices free from skin contact  Taken 4/10/2023 0900 by Melisa Murray RN  Pressure Reduction Techniques: frequent weight shift encouraged  Pressure Reduction Devices: pressure-redistributing mattress utilized  Skin Protection:   adhesive use limited   incontinence pads utilized   tubing/devices free from skin contact   Goal Outcome Evaluation:  Plan of Care Reviewed With: patient        Progress: no change  Outcome Evaluation: Pt continues to alert and oriented x4. RA. PRN meds given for flank pain. Pt reports she is feeling better today but remains sore. Denies any complaints at this time. Has been ambulating to bathroom throughout shift with standby assistance and tolerating well. LR infusing @100. Call light within reach. Will continue to follow plan of care throughout shift until 7p.

## 2023-04-10 NOTE — CASE MANAGEMENT/SOCIAL WORK
Continued Stay Note  BRENNAN Mora     Patient Name: Addis Barcenas  MRN: [id number]  Today's Date: 4/10/2023    Admit Date: 4/9/2023    Plan: Initial CM disposition planning completed in ED. Patient lives at home with family and plans to return home at dc. Family will provide transport home at dc. No DME or Home Health. CM will follow and assist as needed.   Discharge Plan     Row Name 04/10/23 1228       Plan    Plan Initial CM disposition planning completed in ED. Patient lives at home with family and plans to return home at dc. Family will provide transport home at dc. No DME or Home Health. CM will follow and assist as needed.    Patient/Family in Agreement with Plan yes               Discharge Codes    No documentation.               Expected Discharge Date and Time     Expected Discharge Date Expected Discharge Time    Apr 11, 2023             Salina Alejo RN

## 2023-04-11 VITALS
BODY MASS INDEX: 25.79 KG/M2 | DIASTOLIC BLOOD PRESSURE: 80 MMHG | TEMPERATURE: 97.6 F | HEART RATE: 69 BPM | SYSTOLIC BLOOD PRESSURE: 139 MMHG | WEIGHT: 160.5 LBS | HEIGHT: 66 IN | RESPIRATION RATE: 14 BRPM | OXYGEN SATURATION: 99 %

## 2023-04-11 LAB
ANION GAP SERPL CALCULATED.3IONS-SCNC: 5.6 MMOL/L (ref 5–15)
BASOPHILS # BLD AUTO: 0.06 10*3/MM3 (ref 0–0.2)
BASOPHILS NFR BLD AUTO: 0.7 % (ref 0–1.5)
BUN SERPL-MCNC: 10 MG/DL (ref 6–20)
BUN/CREAT SERPL: 12.7 (ref 7–25)
CALCIUM SPEC-SCNC: 8.3 MG/DL (ref 8.6–10.5)
CHLORIDE SERPL-SCNC: 110 MMOL/L (ref 98–107)
CO2 SERPL-SCNC: 24.4 MMOL/L (ref 22–29)
CREAT SERPL-MCNC: 0.79 MG/DL (ref 0.57–1)
DEPRECATED RDW RBC AUTO: 50.5 FL (ref 37–54)
EGFRCR SERPLBLD CKD-EPI 2021: 95.3 ML/MIN/1.73
EOSINOPHIL # BLD AUTO: 0.92 10*3/MM3 (ref 0–0.4)
EOSINOPHIL NFR BLD AUTO: 10.8 % (ref 0.3–6.2)
ERYTHROCYTE [DISTWIDTH] IN BLOOD BY AUTOMATED COUNT: 14.5 % (ref 12.3–15.4)
GLUCOSE SERPL-MCNC: 101 MG/DL (ref 65–99)
HCT VFR BLD AUTO: 32.8 % (ref 34–46.6)
HGB BLD-MCNC: 10.1 G/DL (ref 12–15.9)
IMM GRANULOCYTES # BLD AUTO: 0.02 10*3/MM3 (ref 0–0.05)
IMM GRANULOCYTES NFR BLD AUTO: 0.2 % (ref 0–0.5)
LYMPHOCYTES # BLD AUTO: 1.51 10*3/MM3 (ref 0.7–3.1)
LYMPHOCYTES NFR BLD AUTO: 17.7 % (ref 19.6–45.3)
MAGNESIUM SERPL-MCNC: 2.1 MG/DL (ref 1.6–2.6)
MCH RBC QN AUTO: 29 PG (ref 26.6–33)
MCHC RBC AUTO-ENTMCNC: 30.8 G/DL (ref 31.5–35.7)
MCV RBC AUTO: 94.3 FL (ref 79–97)
MONOCYTES # BLD AUTO: 0.91 10*3/MM3 (ref 0.1–0.9)
MONOCYTES NFR BLD AUTO: 10.7 % (ref 5–12)
NEUTROPHILS NFR BLD AUTO: 5.09 10*3/MM3 (ref 1.7–7)
NEUTROPHILS NFR BLD AUTO: 59.9 % (ref 42.7–76)
NRBC BLD AUTO-RTO: 0 /100 WBC (ref 0–0.2)
PLATELET # BLD AUTO: 194 10*3/MM3 (ref 140–450)
PMV BLD AUTO: 10.2 FL (ref 6–12)
POTASSIUM SERPL-SCNC: 4.3 MMOL/L (ref 3.5–5.2)
QT INTERVAL: 438 MS
QT INTERVAL: 444 MS
QTC INTERVAL: 378 MS
QTC INTERVAL: 469 MS
RBC # BLD AUTO: 3.48 10*6/MM3 (ref 3.77–5.28)
SODIUM SERPL-SCNC: 140 MMOL/L (ref 136–145)
WBC NRBC COR # BLD: 8.51 10*3/MM3 (ref 3.4–10.8)

## 2023-04-11 PROCEDURE — 83735 ASSAY OF MAGNESIUM: CPT | Performed by: STUDENT IN AN ORGANIZED HEALTH CARE EDUCATION/TRAINING PROGRAM

## 2023-04-11 PROCEDURE — 93005 ELECTROCARDIOGRAM TRACING: CPT | Performed by: HOSPITALIST

## 2023-04-11 PROCEDURE — 93010 ELECTROCARDIOGRAM REPORT: CPT | Performed by: INTERNAL MEDICINE

## 2023-04-11 PROCEDURE — 85025 COMPLETE CBC W/AUTO DIFF WBC: CPT | Performed by: INTERNAL MEDICINE

## 2023-04-11 PROCEDURE — 25010000002 KETOROLAC TROMETHAMINE PER 15 MG: Performed by: STUDENT IN AN ORGANIZED HEALTH CARE EDUCATION/TRAINING PROGRAM

## 2023-04-11 PROCEDURE — 99239 HOSP IP/OBS DSCHRG MGMT >30: CPT | Performed by: INTERNAL MEDICINE

## 2023-04-11 PROCEDURE — 80048 BASIC METABOLIC PNL TOTAL CA: CPT | Performed by: INTERNAL MEDICINE

## 2023-04-11 RX ORDER — CEFDINIR 300 MG/1
300 CAPSULE ORAL 2 TIMES DAILY
Qty: 10 CAPSULE | Refills: 0 | Status: SHIPPED | OUTPATIENT
Start: 2023-04-11 | End: 2023-04-16

## 2023-04-11 RX ADMIN — ACETAMINOPHEN 1000 MG: 500 TABLET ORAL at 08:54

## 2023-04-11 RX ADMIN — SODIUM CHLORIDE, POTASSIUM CHLORIDE, SODIUM LACTATE AND CALCIUM CHLORIDE 100 ML/HR: 600; 310; 30; 20 INJECTION, SOLUTION INTRAVENOUS at 06:34

## 2023-04-11 RX ADMIN — CETIRIZINE HYDROCHLORIDE 10 MG: 10 TABLET, FILM COATED ORAL at 08:52

## 2023-04-11 RX ADMIN — Medication 3 ML: at 08:52

## 2023-04-11 RX ADMIN — KETOROLAC TROMETHAMINE 30 MG: 30 INJECTION, SOLUTION INTRAMUSCULAR; INTRAVENOUS at 04:44

## 2023-04-11 NOTE — DISCHARGE SUMMARY
[facility] DISCHARGE SUMMARY      Date of Admission: 4/9/2023    Date of Discharge: 4/11/2023     PCP: Enoc Zamora APRN    Admission Diagnosis:   Please see admission H&P    Discharge Diagnosis:   Severe sepsis  UTI/right-sided pyelonephritis  E coli bacteremia  Hypokalemia  Hypomagnesemia  Sinus bradycardia     Procedures Performed: None     Consults:   Consults     No orders found from 3/11/2023 to 4/10/2023.            History of Present Illness:  Addis Barcenas is a 42 y.o. female who presented to [facility] ED with CC of right back/flank pain. Please see admission H&P for complete details.     In the ED, workup revealed severe sepsis, UTI/right-sided pyelonephritis and electrolyte abnormalities including hypokalemia and hypomagnesemia. CT abd/pelvis revealed right perinephric edema. UDS was positive for amphetamines (phentermine included on home med list) and opiates. Cultures were obtained and IV antibiotics initiated.      Hospital Course  Addis Barcenas was admitted to the PCU for further evaluation and treatment. She was continued on IV antibiotics including high dose Rocephin and maintenance IVF's. The hospital's electrolyte replacement protocols were initiated.     Upon admission, she initially remained hypotensive with subsequent improvement following additional fluid boluses. She was monitored on telemetry with episodes of sinus bradycardia noted (worse at HS). She remained asymptomatic with stable HR at the time of discharge with instructions to follow up with her PCP. Cultures were followed with 1/2 blood cultures revealing E coli per BCID. Her urine culture was finalized revealing mixed nina.     Ms. Barcenas was seen and examined with ELISSA Vincent on 4/11. She remained afebrile and hemodynamically stable with stable/improving AM labs. She felt much improved from upon admission and deemed medically stable for discharge. She was deescalated to [facility] to complete a course of treatment.  Instructions were given for follow up with her PCP in 1 week.     Condition on Discharge:  Stable    Vital Signs  Vitals:    04/11/23 0714   BP: 122/85   Pulse: (!) 49   Resp:    Temp:    SpO2: 98%       Physical Exam:  General:    Awake, alert, in no acute distress   Heart:      Normal S1 and S2. Regular rate and rhythm. No significant murmur, rubs or gallops appreciated.   Lungs:     Respirations regular, even and unlabored. Lungs clear to auscultation B/L. No wheezes, rales or rhonchi.   Abdomen:   Soft. No guarding, rebound tenderness or  organomegaly noted. Bowel sounds present x 4. (+) mild right CVA tenderness but improved.    Extremities:  No clubbing, cyanosis or edema noted. Moves UE and LE equally B/L.     Discharge Disposition:   home      Discharge Medications:     Discharge Medications      New Medications      Instructions Start Date   cefdinir 300 MG capsule  Commonly known as: OMNICEF   300 mg, Oral, 2 Times Daily         Continue These Medications      Instructions Start Date   acetaminophen 500 MG tablet  Commonly known as: TYLENOL   1,000 mg, Oral, Every 4 Hours PRN      levocetirizine 5 MG tablet  Commonly known as: XYZAL   1 tablet, Oral, Daily      ondansetron ODT 4 MG disintegrating tablet  Commonly known as: ZOFRAN-ODT   4 mg, Oral, Every 6 Hours PRN      SUMAtriptan 50 MG tablet  Commonly known as: IMITREX   1 tablet, Oral, Once As Needed         Stop These Medications    nitrofurantoin (macrocrystal-monohydrate) 100 MG capsule  Commonly known as: MACROBID     phenazopyridine 200 MG tablet  Commonly known as: PYRIDIUM     phentermine 37.5 MG tablet  Commonly known as: ADIPEX-P              Discharge Diet:   Dietary Orders (From admission, onward)     Start     Ordered    04/09/23 9637  Diet: Regular/House Diet; Texture: Regular Texture (IDDSI 7); Fluid Consistency: Thin (IDDSI 0)  Diet Effective Now        References:    Diet Order [place]   Question Answer Comment   Diets: Regular/House  Diet    Texture: Regular Texture (IDDSI 7)    Fluid Consistency: Thin (IDDSI 0)        04/09/23 1446                Activity at Discharge:  activity as tolerated    Follow-up Appointments:  Additional Instructions for the Follow-ups that You Need to Schedule     Discharge Follow-up with PCP   As directed       Currently Documented PCP:    Enoc Zamora APRN    PCP Phone Number:    [phone]     Follow Up Details: Follow up with [facility] in 1 week.            Follow-up Information     Enoc Zamora APRN .    Specialty: Nurse Practitioner  Why: Follow up with [facility] in 1 week.  Contact information:  [address] [place] [address]  [phone]                             Test Results Pending at Discharge:  Pending Labs     Order Current Status    Blood Culture - Blood, Hand, Left Preliminary result    Blood Culture - Blood, Hand, Right Preliminary result           The 10-year ASCVD risk score (Bonifacio HART, et al., 2019) is: 0.9%    Values used to calculate the score:      Age: 42 years      Sex: Female      Is Non- : No      Diabetic: No      Tobacco smoker: Yes      Systolic Blood Pressure: 122 mmHg      Is BP treated: No      HDL Cholesterol: 68 mg/dL      Total Cholesterol: 148 mg/dL      Bryce Elaine DO  04/11/23  08:17 EDT      Time: Greater than 30 minutes spent on this discharge.

## 2023-04-11 NOTE — PLAN OF CARE
Problem: Adult Inpatient Plan of Care  Goal: Plan of Care Review  Outcome: Ongoing, Progressing   Pt resting at this time. VSS on RA. Ryan at times throughout night, provider aware. Updated on plan of care. No questions/concerns at this time.

## 2023-04-12 ENCOUNTER — HOSPITAL ENCOUNTER (OUTPATIENT)
Dept: GENERAL RADIOLOGY | Facility: HOSPITAL | Age: 43
Discharge: HOME | End: 2023-04-12
Admitting: REGISTERED NURSE
Payer: COMMERCIAL

## 2023-04-12 ENCOUNTER — TRANSCRIBE ORDERS (OUTPATIENT)
Dept: ADMINISTRATIVE | Facility: HOSPITAL | Age: 43
End: 2023-04-12
Payer: COMMERCIAL

## 2023-04-12 DIAGNOSIS — R53.83 TIREDNESS: ICD-10-CM

## 2023-04-12 DIAGNOSIS — R00.1 PERSISTENT SINUS BRADYCARDIA: ICD-10-CM

## 2023-04-12 DIAGNOSIS — R06.02 SHORTNESS OF BREATH: ICD-10-CM

## 2023-04-12 DIAGNOSIS — R60.9 EDEMA, UNSPECIFIED TYPE: ICD-10-CM

## 2023-04-12 DIAGNOSIS — R06.02 SHORTNESS OF BREATH: Primary | ICD-10-CM

## 2023-04-12 LAB
BACTERIA SPEC AEROBE CULT: ABNORMAL
GRAM STN SPEC: ABNORMAL
ISOLATED FROM: ABNORMAL

## 2023-04-12 PROCEDURE — 71046 X-RAY EXAM CHEST 2 VIEWS: CPT

## 2023-04-12 PROCEDURE — 71046 X-RAY EXAM CHEST 2 VIEWS: CPT | Performed by: RADIOLOGY

## 2023-04-14 LAB — BACTERIA SPEC AEROBE CULT: NORMAL

## 2023-05-01 ENCOUNTER — OFFICE VISIT (OUTPATIENT)
Dept: CARDIOLOGY | Facility: CLINIC | Age: 43
End: 2023-05-01
Payer: COMMERCIAL

## 2023-05-01 ENCOUNTER — PATIENT ROUNDING (BHMG ONLY) (OUTPATIENT)
Dept: CARDIOLOGY | Facility: CLINIC | Age: 43
End: 2023-05-01

## 2023-05-01 VITALS
HEIGHT: 66 IN | HEART RATE: 75 BPM | OXYGEN SATURATION: 99 % | WEIGHT: 152.6 LBS | RESPIRATION RATE: 20 BRPM | SYSTOLIC BLOOD PRESSURE: 107 MMHG | DIASTOLIC BLOOD PRESSURE: 75 MMHG | BODY MASS INDEX: 24.53 KG/M2

## 2023-05-01 DIAGNOSIS — D64.9 LOW HEMOGLOBIN: ICD-10-CM

## 2023-05-01 DIAGNOSIS — R00.1 BRADYCARDIA: Primary | ICD-10-CM

## 2023-05-01 DIAGNOSIS — R06.00 DYSPNEA, UNSPECIFIED TYPE: ICD-10-CM

## 2023-05-01 DIAGNOSIS — Z78.9 ELECTRONIC CIGARETTE USE: ICD-10-CM

## 2023-05-01 NOTE — PROGRESS NOTES
Subjective     Addis Barcenas is a 43 y.o. female.   Chief Complaint   Patient presents with   • Heart Problem     New pt here with heart problems and bp     History of Present Illness   Addis Barcenas is a 43-year-old female who presents to clinic today as a new patient for cardiology evaluation    She was referred by PCP for further evaluation of bradycardia during hospitalization.  She reports recent hospitalization for sepsis from UTI.  During hospitalization she reports at night while sleeping her heart rate dropped to the 30s and they suggested at discharge to be further evaluated by cardiology.  She reports occasional dizziness but denies syncope.  She denies palpitations or chest discomfort.  She states after hospitalization she had an episode where she had a 10 pound weight gain in a couple of days.  She was treated with Lasix and the fluid came off quickly.  She has concerns for heart failure as there is a family history.  She has no underlying hypertension, DM or hyperlipidemia that she is aware of.  She does vape.  Family history significant for cardiac disease.  No previous history of anemia but upon further review of her chart her hemoglobin was slightly low during hospitalization.  TSH was low normal.    Patient Active Problem List   Diagnosis   • Severe sepsis   • Bradycardia   • Dyspnea   • Electronic cigarette use     Past Medical History:   Diagnosis Date   • Allergies      Past Surgical History:   Procedure Laterality Date   • EXPLORATORY LAPAROTOMY         Family History   Problem Relation Age of Onset   • Stroke Mother          in her 50's     Social History     Tobacco Use   • Smoking status: Every Day     Packs/day: 0.50     Types: Cigarettes     Passive exposure: Past   • Smokeless tobacco: Never   Vaping Use   • Vaping Use: Every day   • Start date: 2021   • Devices: Refillable tank, 6% NICOTINE   Substance Use Topics   • Alcohol use: No   • Drug use: No         The following  portions of the patient's history were reviewed and updated as appropriate: allergies, current medications, past family history, past medical history, past social history, past surgical history and problem list.    No Known Allergies      Current Outpatient Medications:   •  acetaminophen (TYLENOL) 500 MG tablet, Take 2 tablets by mouth Every 4 (Four) Hours As Needed for Fever., Disp: , Rfl:   •  levocetirizine (XYZAL) 5 MG tablet, Take 1 tablet by mouth Daily., Disp: , Rfl:   •  ondansetron ODT (ZOFRAN-ODT) 4 MG disintegrating tablet, Take 1 tablet by mouth Every 6 (Six) Hours As Needed for Nausea or Vomiting., Disp: 10 tablet, Rfl: 0  •  SUMAtriptan (IMITREX) 50 MG tablet, Take 1 tablet by mouth 1 (One) Time As Needed for Migraine., Disp: , Rfl:     Review of Systems   Constitutional: Negative for activity change, appetite change, chills, diaphoresis, fatigue and fever.   HENT: Negative for congestion, drooling, ear discharge, ear pain, mouth sores, nosebleeds, postnasal drip, rhinorrhea, sinus pressure, sneezing and sore throat.    Eyes: Negative for pain, discharge and visual disturbance.   Respiratory: Positive for shortness of breath. Negative for cough, chest tightness and wheezing.    Cardiovascular: Negative for chest pain, palpitations and leg swelling.   Gastrointestinal: Negative for abdominal pain, constipation, diarrhea, nausea and vomiting.   Endocrine: Negative for cold intolerance, heat intolerance, polydipsia, polyphagia and polyuria.   Musculoskeletal: Negative for arthralgias, myalgias and neck pain.   Skin: Negative for rash and wound.   Neurological: Positive for dizziness. Negative for syncope, speech difficulty, weakness, light-headedness and headaches.   Hematological: Negative for adenopathy. Does not bruise/bleed easily.   Psychiatric/Behavioral: Negative for confusion, dysphoric mood and sleep disturbance. The patient is not nervous/anxious.    All other systems reviewed and are  "negative.    /75 (BP Location: Right arm, Patient Position: Sitting, Cuff Size: Adult)   Pulse 75   Resp 20   Ht 167.6 cm (66\")   Wt 69.2 kg (152 lb 9.6 oz)   SpO2 99%   BMI 24.63 kg/m²     Objective   No Known Allergies    Physical Exam  Vitals reviewed.   Constitutional:       Appearance: Normal appearance. She is well-developed.   HENT:      Head: Normocephalic.   Eyes:      Conjunctiva/sclera: Conjunctivae normal.   Neck:      Thyroid: No thyromegaly.      Vascular: No carotid bruit or JVD.   Cardiovascular:      Rate and Rhythm: Normal rate and regular rhythm.   Pulmonary:      Effort: Pulmonary effort is normal.      Breath sounds: Normal breath sounds.   Musculoskeletal:      Cervical back: Neck supple.      Right lower leg: No edema.      Left lower leg: No edema.   Skin:     General: Skin is warm and dry.   Neurological:      Mental Status: She is alert and oriented to person, place, and time.   Psychiatric:         Attention and Perception: Attention normal.         Mood and Affect: Mood normal.         Speech: Speech normal.         Behavior: Behavior normal. Behavior is cooperative.         Cognition and Memory: Cognition normal.           ECG 12 Lead    Date/Time: 5/1/2023 1:49 PM  Performed by: Leeanne Pinto APRN  Authorized by: Leeanne Pinto APRN   Comparison: compared with previous ECG   Similar to previous ECG  Comparison to previous ECG: Sinus rhythm replaces sinus bradycardia   Rhythm: sinus rhythm  Rate: normal  BPM: 71  Other findings: early repolarization    Clinical impression: normal ECG  Comments: QT/QTc - 391/414            LABS  WBC   Date Value Ref Range Status   04/11/2023 8.51 3.40 - 10.80 10*3/mm3 Final     RBC   Date Value Ref Range Status   04/11/2023 3.48 (L) 3.77 - 5.28 10*6/mm3 Final     Hemoglobin   Date Value Ref Range Status   04/11/2023 10.1 (L) 12.0 - 15.9 g/dL Final     Hematocrit   Date Value Ref Range Status   04/11/2023 32.8 (L) 34.0 - 46.6 % Final "     MCV   Date Value Ref Range Status   04/11/2023 94.3 79.0 - 97.0 fL Final     MCH   Date Value Ref Range Status   04/11/2023 29.0 26.6 - 33.0 pg Final     MCHC   Date Value Ref Range Status   04/11/2023 30.8 (L) 31.5 - 35.7 g/dL Final     RDW   Date Value Ref Range Status   04/11/2023 14.5 12.3 - 15.4 % Final     RDW-SD   Date Value Ref Range Status   04/11/2023 50.5 37.0 - 54.0 fl Final     MPV   Date Value Ref Range Status   04/11/2023 10.2 6.0 - 12.0 fL Final     Platelets   Date Value Ref Range Status   04/11/2023 194 140 - 450 10*3/mm3 Final     Neutrophil %   Date Value Ref Range Status   04/11/2023 59.9 42.7 - 76.0 % Final     Lymphocyte %   Date Value Ref Range Status   04/11/2023 17.7 (L) 19.6 - 45.3 % Final     Monocyte %   Date Value Ref Range Status   04/11/2023 10.7 5.0 - 12.0 % Final     Eosinophil %   Date Value Ref Range Status   04/11/2023 10.8 (H) 0.3 - 6.2 % Final     Basophil %   Date Value Ref Range Status   04/11/2023 0.7 0.0 - 1.5 % Final     Immature Grans %   Date Value Ref Range Status   04/11/2023 0.2 0.0 - 0.5 % Final     Neutrophils, Absolute   Date Value Ref Range Status   04/11/2023 5.09 1.70 - 7.00 10*3/mm3 Final     Lymphocytes, Absolute   Date Value Ref Range Status   04/11/2023 1.51 0.70 - 3.10 10*3/mm3 Final     Monocytes, Absolute   Date Value Ref Range Status   04/11/2023 0.91 (H) 0.10 - 0.90 10*3/mm3 Final     Eosinophils, Absolute   Date Value Ref Range Status   04/11/2023 0.92 (H) 0.00 - 0.40 10*3/mm3 Final     Basophils, Absolute   Date Value Ref Range Status   04/11/2023 0.06 0.00 - 0.20 10*3/mm3 Final     Immature Grans, Absolute   Date Value Ref Range Status   04/11/2023 0.02 0.00 - 0.05 10*3/mm3 Final     nRBC   Date Value Ref Range Status   04/11/2023 0.0 0.0 - 0.2 /100 WBC Final       Total Cholesterol   Date Value Ref Range Status   01/13/2021 148 0 - 200 mg/dL Final     Triglycerides   Date Value Ref Range Status   01/13/2021 79 0 - 150 mg/dL Final     HDL  Cholesterol   Date Value Ref Range Status   01/13/2021 68 (H) 40 - 60 mg/dL Final     LDL Cholesterol    Date Value Ref Range Status   01/13/2021 65 0 - 100 mg/dL Final     Imaging  XR Chest 1 View    Result Date: 4/9/2023  Increased lung markings, suspect related to underlying airways disease such as reactive airways disease or bronchitis/bronchiolitis Signer Name: Cliff Ahn MD  Signed: 4/9/2023 12:50 PM  Workstation Name: [id number]  [facility]    CT Abdomen Pelvis Stone Protocol    Result Date: 4/9/2023  1. No urolithiasis or hydronephrosis. 2. Mild asymmetric right perinephric edema. Nonspecific though could indicate pyelonephritis in the appropriate clinical and laboratory setting. Recommend correlation with urinalysis. Signer Name: Cliff Ahn MD  Signed: 4/9/2023 12:06 PM  Workstation Name: [id number]  [facility]    CT Abdomen Pelvis Stone Protocol    Result Date: 2/8/2023  Negative CT of the abdomen and pelvis. Signer Name: Carl Isaac MD  Signed: 2/8/2023 11:20 PM  Workstation Name: [id number]  [facility]    XR Chest PA & Lateral    Result Date: 4/13/2023    Unremarkable radiographic appearance of the chest.  This report was finalized on 4/13/2023 9:44 AM by Dr. Jhoan Vargas MD.              Assessment & Plan   Diagnoses and all orders for this visit:    1. Bradycardia (Primary)  -     ECG 12 Lead  -     Holter Monitor - 72 Hour Up To 15 Days; Future  -     CBC & Differential; Future  -     Comprehensive Metabolic Panel; Future  -     TSH; Future  -     T4, Free; Future  Holter monitor with results pending  Repeat labs  EKG reviewed and discussed today, heart rate is normal in clinic  Discussed that heart rate can sometimes be lower during sleep and illness could have also contributed.  Denies syncope but will rule out arrhythmias or blocks with the monitor.    2. Dyspnea, unspecified type  -     Adult  Transthoracic Echo Complete W/ Cont if Necessary Per Protocol; Future  -     BNP; Future  Further evaluation with an echocardiogram  Labs to rule out heart failure  Sodium restrictions     3. Low hemoglobin  recheck labs    4. Electronic cigarette use  Recommend avoidance of use      Lifestyle modifications including heart healthy diet, regular exercise, maintenance of desirable body weight and avoidance of tobacco products.        Review of medical record  Follow-up in 4 weeks to discuss and review testing, sooner if needed.

## 2023-05-01 NOTE — PROGRESS NOTES
Luis. My name is Yvette. 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         What things do you feel went well with your visit?        Do you have recommendations on ways we may improve?        Overall were you satisfied with your first visit to our practice?        I appreciate you taking the time to answer a few questions today.         We're always looking for ways to make our patients' experiences even better. Please complete the [employer] Survey after your visits. We would love to receive feedback about your visits. You may also share any suggestions or concerns by replying to this message or calling our office.         Thank you for allowing us to participate in your healthcare. Please let me know if I can be of further assistance.                Kind regards,      Yvette

## 2023-05-01 NOTE — LETTER
May 1, 2023     PAMELA Pandya  [address] [address]    Patient: Addis Barcenas   YOB: 1980   Date of Visit: 5/1/2023       Dear PAMELA Pandya,    Addis Barcenas was in my office today. Below are the relevant portions of my assessment and plan of care.           If you have questions, please do not hesitate to call me. I look forward to following Addis along with you.         Sincerely,        PAMELA Lo        CC: No Recipients

## 2023-05-17 ENCOUNTER — HOSPITAL ENCOUNTER (OUTPATIENT)
Dept: CARDIOLOGY | Facility: HOSPITAL | Age: 43
Discharge: HOME | End: 2023-05-17
Admitting: NURSE PRACTITIONER
Payer: COMMERCIAL

## 2023-05-17 DIAGNOSIS — R06.00 DYSPNEA, UNSPECIFIED TYPE: ICD-10-CM

## 2023-05-17 LAB
BH CV ECHO MEAS - AO MAX PG: 6.1 MMHG
BH CV ECHO MEAS - AO MEAN PG: 3 MMHG
BH CV ECHO MEAS - AO ROOT DIAM: 2.8 CM
BH CV ECHO MEAS - AO V2 MAX: 123 CM/SEC
BH CV ECHO MEAS - AO V2 VTI: 24.1 CM
BH CV ECHO MEAS - EDV(CUBED): 68.9 ML
BH CV ECHO MEAS - EDV(MOD-SP4): 40.4 ML
BH CV ECHO MEAS - EF(MOD-SP4): 58.7 %
BH CV ECHO MEAS - ESV(CUBED): 29.8 ML
BH CV ECHO MEAS - ESV(MOD-SP4): 16.7 ML
BH CV ECHO MEAS - FS: 24.4 %
BH CV ECHO MEAS - IVS/LVPW: 0.82 CM
BH CV ECHO MEAS - IVSD: 0.9 CM
BH CV ECHO MEAS - LA DIMENSION: 3.1 CM
BH CV ECHO MEAS - LAT PEAK E' VEL: 15.7 CM/SEC
BH CV ECHO MEAS - LV DIASTOLIC VOL/BSA (35-75): 22.7 CM2
BH CV ECHO MEAS - LV MASS(C)D: 132.1 GRAMS
BH CV ECHO MEAS - LV SYSTOLIC VOL/BSA (12-30): 9.4 CM2
BH CV ECHO MEAS - LVIDD: 4.1 CM
BH CV ECHO MEAS - LVIDS: 3.1 CM
BH CV ECHO MEAS - LVOT AREA: 2.27 CM2
BH CV ECHO MEAS - LVOT DIAM: 1.7 CM
BH CV ECHO MEAS - LVPWD: 1.1 CM
BH CV ECHO MEAS - MED PEAK E' VEL: 12.6 CM/SEC
BH CV ECHO MEAS - MV A MAX VEL: 90 CM/SEC
BH CV ECHO MEAS - MV E MAX VEL: 102 CM/SEC
BH CV ECHO MEAS - MV E/A: 1.13
BH CV ECHO MEAS - PA ACC TIME: 0.13 SEC
BH CV ECHO MEAS - PA PR(ACCEL): 18.7 MMHG
BH CV ECHO MEAS - RAP SYSTOLE: 10 MMHG
BH CV ECHO MEAS - RVSP: 24.3 MMHG
BH CV ECHO MEAS - SI(MOD-SP4): 13.3 ML/M2
BH CV ECHO MEAS - SV(MOD-SP4): 23.7 ML
BH CV ECHO MEAS - TAPSE (>1.6): 2.01 CM
BH CV ECHO MEAS - TR MAX PG: 14.3 MMHG
BH CV ECHO MEAS - TR MAX VEL: 189 CM/SEC
BH CV ECHO MEASUREMENTS AVERAGE E/E' RATIO: 7.21
LEFT ATRIUM VOLUME INDEX: 16.8 ML/M2
MAXIMAL PREDICTED HEART RATE: 176 BPM
STRESS TARGET HR: 150 BPM

## 2023-05-17 PROCEDURE — 93306 TTE W/DOPPLER COMPLETE: CPT | Performed by: INTERNAL MEDICINE

## 2023-05-17 PROCEDURE — 93306 TTE W/DOPPLER COMPLETE: CPT

## 2023-05-18 ENCOUNTER — TELEPHONE (OUTPATIENT)
Dept: CARDIOLOGY | Facility: CLINIC | Age: 43
End: 2023-05-18
Payer: COMMERCIAL

## 2023-05-18 NOTE — TELEPHONE ENCOUNTER
Called pt and informed her of the following per Leeanne Pinto APRN   Echocardiogram is normal, will discuss further at follow up appt

## 2023-05-18 NOTE — TELEPHONE ENCOUNTER
----- Message from PAMELA Newsome sent at 5/18/2023 12:37 PM EDT -----  Echocardiogram is normal, will discuss further at follow up appt

## 2023-05-23 ENCOUNTER — TREATMENT (OUTPATIENT)
Dept: CARDIOLOGY | Facility: CLINIC | Age: 43
End: 2023-05-23
Payer: COMMERCIAL

## 2023-05-23 DIAGNOSIS — R00.1 BRADYCARDIA: ICD-10-CM

## 2023-05-26 ENCOUNTER — TELEPHONE (OUTPATIENT)
Dept: CARDIOLOGY | Facility: CLINIC | Age: 43
End: 2023-05-26
Payer: COMMERCIAL

## 2023-05-26 NOTE — TELEPHONE ENCOUNTER
Latrell pt and informed her of the following Holter monitor with a few episodes of fast heart beat. Will discuss further at follow up appt per Leeanne Altamirano

## 2025-08-14 ENCOUNTER — HOSPITAL ENCOUNTER (OUTPATIENT)
Dept: GENERAL RADIOLOGY | Facility: HOSPITAL | Age: 45
Discharge: HOME | End: 2025-08-14
Admitting: REGISTERED NURSE
Payer: COMMERCIAL

## 2025-08-14 ENCOUNTER — TRANSCRIBE ORDERS (OUTPATIENT)
Dept: ADMINISTRATIVE | Facility: HOSPITAL | Age: 45
End: 2025-08-14
Payer: COMMERCIAL

## 2025-08-14 DIAGNOSIS — M25.572 ARTHRALGIA OF LEFT ANKLE OR FOOT: ICD-10-CM
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
## 2025-08-14 DIAGNOSIS — M79.675 PAIN IN TOE OF LEFT FOOT: ICD-10-CM

## 2025-08-14 DIAGNOSIS — M25.572 ARTHRALGIA OF LEFT ANKLE OR FOOT: Primary | ICD-10-CM

## 2025-08-14 PROCEDURE — 73630 X-RAY EXAM OF FOOT: CPT

## 2025-08-14 PROCEDURE — 73660 X-RAY EXAM OF TOE(S): CPT
